# Patient Record
Sex: FEMALE | Race: BLACK OR AFRICAN AMERICAN | URBAN - METROPOLITAN AREA
[De-identification: names, ages, dates, MRNs, and addresses within clinical notes are randomized per-mention and may not be internally consistent; named-entity substitution may affect disease eponyms.]

---

## 2017-04-07 ENCOUNTER — TELEPHONE (OUTPATIENT)
Dept: INTERNAL MEDICINE CLINIC | Age: 42
End: 2017-04-07

## 2017-04-07 NOTE — TELEPHONE ENCOUNTER
----- Message from Kaden Mcconnell sent at 4/7/2017  1:05 PM EDT -----  Regarding: Dr. Britni Villasenor Ms. Dolly Shelton P) [phone], from disability, was inquiring about the status of the records request mailed on 03/21/17.

## 2018-05-02 ENCOUNTER — OFFICE VISIT (OUTPATIENT)
Dept: INTERNAL MEDICINE CLINIC | Age: 43
End: 2018-05-02

## 2018-05-02 VITALS
BODY MASS INDEX: 45.99 KG/M2 | HEART RATE: 71 BPM | OXYGEN SATURATION: 98 % | DIASTOLIC BLOOD PRESSURE: 69 MMHG | RESPIRATION RATE: 17 BRPM | SYSTOLIC BLOOD PRESSURE: 149 MMHG | HEIGHT: 67 IN | WEIGHT: 293 LBS | TEMPERATURE: 98 F

## 2018-05-02 DIAGNOSIS — Z12.31 SCREENING MAMMOGRAM, ENCOUNTER FOR: ICD-10-CM

## 2018-05-02 DIAGNOSIS — I10 ESSENTIAL HYPERTENSION: ICD-10-CM

## 2018-05-02 DIAGNOSIS — E66.01 OBESITY, MORBID (HCC): ICD-10-CM

## 2018-05-02 DIAGNOSIS — E11.9 TYPE 2 DIABETES MELLITUS WITHOUT COMPLICATION, WITHOUT LONG-TERM CURRENT USE OF INSULIN (HCC): Primary | ICD-10-CM

## 2018-05-02 LAB — HBA1C MFR BLD HPLC: 8 %

## 2018-05-02 RX ORDER — METFORMIN HYDROCHLORIDE 1000 MG/1
1000 TABLET ORAL 2 TIMES DAILY WITH MEALS
Qty: 60 TAB | Refills: 1 | Status: SHIPPED | OUTPATIENT
Start: 2018-05-02 | End: 2018-05-02 | Stop reason: SDUPTHER

## 2018-05-02 RX ORDER — LISINOPRIL 20 MG/1
20 TABLET ORAL DAILY
Qty: 30 TAB | Refills: 2 | Status: SHIPPED | OUTPATIENT
Start: 2018-05-02 | End: 2018-05-02 | Stop reason: SDUPTHER

## 2018-05-02 RX ORDER — METFORMIN HYDROCHLORIDE 1000 MG/1
1000 TABLET ORAL 2 TIMES DAILY WITH MEALS
Qty: 60 TAB | Refills: 2 | Status: SHIPPED | OUTPATIENT
Start: 2018-05-02 | End: 2019-06-20 | Stop reason: SDUPTHER

## 2018-05-02 RX ORDER — LISINOPRIL 20 MG/1
20 TABLET ORAL DAILY
Qty: 30 TAB | Refills: 2 | Status: SHIPPED | OUTPATIENT
Start: 2018-05-02 | End: 2019-06-20 | Stop reason: SDUPTHER

## 2018-05-02 NOTE — LETTER
5/4/2018 11:55 AM 
 
Ms. Nina Marrufo 
[address] [address] [address] [facility] Dear Nina Marrufo: 
 
Please find your most recent results below. Resulted Orders AMB POC HEMOGLOBIN A1C Result Value Ref Range Hemoglobin A1c (POC) 8.0 % MICROALBUMIN, UR, RAND W/ MICROALB/CREAT RATIO Result Value Ref Range Creatinine, urine 158.6 Not Estab. mg/dL Microalbumin, urine 12.9 Not Estab. ug/mL Microalb/Creat ratio (ug/mg creat.) 8.1 0.0 - 30.0 mg/g creat Narrative Performed at:  [address]  [id number] : Norma Chavez MD, Phone:  [phone] LIPID PANEL Result Value Ref Range Cholesterol, total 154 100 - 199 mg/dL Triglyceride 68 0 - 149 mg/dL HDL Cholesterol 40 >39 mg/dL VLDL, calculated 14 5 - 40 mg/dL LDL, calculated 100 (H) 0 - 99 mg/dL Narrative Performed at:  [address]  [id number] : Norma Chavez MD, Phone:  [phone] METABOLIC PANEL, COMPREHENSIVE Result Value Ref Range Glucose 186 (H) 65 - 99 mg/dL BUN 9 6 - 24 mg/dL Creatinine 0.68 0.57 - 1.00 mg/dL GFR est non- >59 mL/min/1.73 GFR est  >59 mL/min/1.73  
 BUN/Creatinine ratio 13 9 - 23 Sodium 139 134 - 144 mmol/L Potassium 4.3 3.5 - 5.2 mmol/L Chloride 97 96 - 106 mmol/L  
 CO2 21 18 - 29 mmol/L Calcium 9.3 8.7 - 10.2 mg/dL Protein, total 7.2 6.0 - 8.5 g/dL Albumin 4.1 3.5 - 5.5 g/dL GLOBULIN, TOTAL 3.1 1.5 - 4.5 g/dL A-G Ratio 1.3 1.2 - 2.2 Bilirubin, total 0.5 0.0 - 1.2 mg/dL Alk. phosphatase 84 39 - 117 IU/L  
 AST (SGOT) 11 0 - 40 IU/L  
 ALT (SGPT) 10 0 - 32 IU/L Narrative Performed at:  [address]  [id number] : Norma Chavez MD, Phone:  [phone] TSH AND FREE T4 Result Value Ref Range TSH 2.870 0.450 - 4.500 uIU/mL T4, Free 1.39 0.82 - 1.77 ng/dL Narrative Performed at:  [address]  [id number] : Abhishek Ba MD, Phone:  [phone] CVD REPORT Result Value Ref Range INTERPRETATION Note Comment:  
   Supplemental report is available. Narrative Performed at:  [address] [place] [address]  [id number] : Francisco Rodriguez MD, Phone:  [phone] DIABETES PATIENT EDUCATION Result Value Ref Range PDF Image Not applicable Narrative Performed at:  [address] [place] [address]  [id number] : Francisco Rodriguez MD, Phone:  [phone] RECOMMENDATIONS: 
 
1. Thyroid, kidney, liver function is normal.  
 
2. Cholesterol level is stable Please call me if you have any questions: [phone] Sincerely, 
 
 
Rey Roy MD

## 2018-05-02 NOTE — MR AVS SNAPSHOT
[address] [place] [address]. Mick[address] Far[address] [address] 
[phone] Patient: Rocio Rivas MRN: [id number] :[id number] Visit Information Date & Time Provider Department Dept. Phone Encounter #  
 2018 10:30 AM Rodríguez Morrison MD [facility] Internal Medicine [phone] [id number] Follow-up Instructions Return in about 3 months (around 2018), or if symptoms worsen or fail to improve. Upcoming Health Maintenance Date Due  
 LIPID PANEL Q1 1974 FOOT EXAM Q1 1984 EYE EXAM RETINAL OR DILATED Q1 1984 Pneumococcal 19-64 Medium Risk (1 of 1 - PPSV23) 1993 DTaP/Tdap/Td series (1 - Tdap) 1995 PAP AKA CERVICAL CYTOLOGY 1995 HEMOGLOBIN A1C Q6M 2016 MICROALBUMIN Q1 2017 Influenza Age 5 to Adult 2018 Allergies as of 2018  Review Complete On: 2018 By: Kymberly Frost MD  
 No Known Allergies Current Immunizations  Never Reviewed No immunizations on file. Not reviewed this visit You Were Diagnosed With   
  
 Codes Comments Type 2 diabetes mellitus without complication, without long-term current use of insulin (HCC)    -  Primary ICD-10-CM: E11.9 ICD-9-CM: 250.00 Essential hypertension     ICD-10-CM: I10 
ICD-9-CM: 401.9 Obesity, morbid ([facility] [address]     ICD-10-CM: E66.01 
ICD-9-CM: 278.01 Screening mammogram, encounter for     ICD-10-CM: Z12.31 
ICD-9-CM: V76.12 Vitals BP Pulse Temp Resp Height(growth percentile) Weight(growth percentile) 149/69 (BP 1 Location: Left arm, BP Patient Position: Sitting) 71 98 °F (36.7 °C) (Temporal) 17 5' 7\" (1.702 m) 296 lb 9.6 oz (134.5 kg) LMP SpO2 BMI OB Status Smoking Status 2018 98% 46.45 kg/m2 Having regular periods Never Smoker Vitals History BMI and BSA Data Body Mass Index Body Surface Area  
 46.45 kg/m 2 2.52 m 2 Preferred Pharmacy Pharmacy Name Phone [id number] [address] [facility] [phone] Your Updated Medication List  
  
   
This list is accurate as of 5/2/18 10:51 AM.  Always use your most recent med list.  
  
  
  
  
 Blood-Glucose Meter monitoring kit Check blood sugar daily Lancets Misc Check blood sugar daily  
  
 lidocaine 5 % ointment Commonly known as:  XYLOCAINE Apply  to affected area as needed for Pain. lisinopril 20 mg tablet Commonly known as:  Reji Human Take 1 Tab by mouth daily. metFORMIN 1,000 mg tablet Commonly known as:  GLUCOPHAGE Take 1 Tab by mouth two (2) times daily (with meals). Prescriptions Sent to Pharmacy Refills  
 metFORMIN (GLUCOPHAGE) 1,000 mg tablet 1 Sig: Take 1 Tab by mouth two (2) times daily (with meals). Class: Normal  
 Pharmacy: [id number] [address] [place] [address] Ph #: [phone] Route: Oral  
 lisinopril (PRINIVIL, ZESTRIL) 20 mg tablet 2 Sig: Take 1 Tab by mouth daily. Class: Normal  
 Pharmacy: [id number] at [address] [place] [address] Ph #: [phone] Route: Oral  
  
We Performed the Following AMB POC HEMOGLOBIN A1C [81974 CPT(R)] LIPID PANEL [67941 CPT(R)] METABOLIC PANEL, COMPREHENSIVE [78515 CPT(R)] MICROALBUMIN, UR, RAND W/ MICROALB/CREAT RATIO [id number] CPT(R)] TSH AND FREE T4 [40129 CPT(R)] Follow-up Instructions Return in about 3 months (around 8/2/2018), or if symptoms worsen or fail to improve. To-Do List   
 05/02/2018 Imaging:  [facility] MAMMO BI SCREENING INCL CAD Introducing [facility]! Andrade Lee introduces [employer] patient portal. Now you can access parts of your medical record, email your doctor's office, and request medication refills online. 1. In your internet browser, go to [email or web address] 2. Click on the First Time User? Click Here link in the Sign In box. You will see the New Member Sign Up page. 3. Enter your BMdr Access Code exactly as it appears below. You will not need to use this code after youve completed the sign-up process. If you do not sign up before the expiration date, you must request a new code. · BMdr Access Code: [email or web address] Expires: 7/31/2018 10:51 AM 
 
4. Enter the last four digits of your Social Security Number (xxxx) and Date of Birth (mm/dd/yyyy) as indicated and click Submit. You will be taken to the next sign-up page. 5. Create a BMdr ID. This will be your BMdr login ID and cannot be changed, so think of one that is secure and easy to remember. 6. Create a BMdr password. You can change your password at any time. 7. Enter your Password Reset Question and Answer. This can be used at a later time if you forget your password. 8. Enter your e-mail address. You will receive e-mail notification when new information is available in [address] 9. Click Sign Up. You can now view and download portions of your medical record. 10. Click the Download Summary menu link to download a portable copy of your medical information. If you have questions, please visit the Frequently Asked Questions section of the BMdr website. Remember, BMdr is NOT to be used for urgent needs. For medical emergencies, dial 911. Now available from your iPhone and Android! Please provide this summary of care documentation to your next provider. Your primary care clinician is listed as Rodríguezjacky Laws. If you have any questions after today's visit, please call [phone].

## 2018-05-02 NOTE — PROGRESS NOTES
Subjective:     Chief Complaint   Patient presents with    Diabetes    Hypertension    Medication Refill     has not had any medication in over a year d/t insurance        She  is a 39y.o. year old pleasent female with h/o DM-2, HTN who presents today after two years for follow up on her chronic medical condition. She had not had any med for the past one year due to not having health insurance. Last a1c was 11. in 2016. She denies any cough, chest pain, urinary symptoms, blurred vision. Pertinent items are noted in HPI.   Objective:     Vitals:    05/02/18 1018   BP: 149/69   Pulse: 71   Resp: 17   Temp: 98 °F (36.7 °C)   TempSrc: Temporal   SpO2: 98%   Weight: 296 lb 9.6 oz (134.5 kg)   Height: 5' 7\" (1.702 m)       Physical Examination: General appearance - alert, well appearing, and in no distress, oriented to person, place, and time and overweight  Mental status - alert, oriented to person, place, and time, normal mood, behavior, speech, dress, motor activity, and thought processes  Chest - clear to auscultation, no wheezes, rales or rhonchi, symmetric air entry  Heart - normal rate, regular rhythm, normal S1, S2, no murmurs, rubs, clicks or gallops  Musculoskeletal - no joint tenderness, deformity or swelling  Extremities - peripheral pulses normal, no pedal edema, no clubbing or cyanosis, feet normal, good pulses, normal color, temperature and sensation, monofilament sensory exam is normal in both feet    No Known Allergies   Social History     Social History    Marital status:      Spouse name: N/A    Number of children: N/A    Years of education: N/A     Social History Main Topics    Smoking status: Never Smoker    Smokeless tobacco: Never Used    Alcohol use No      Comment: rarely    Drug use: No    Sexual activity: Yes     Partners: Male     Other Topics Concern    None     Social History Narrative      Family History   Problem Relation Age of Onset    Heart Disease Mother    Wilber Mora Hypertension Mother     Stroke Mother     Heart Attack Mother     Diabetes Mother     Diabetes Father     Cancer Maternal Aunt      breast cancer      Past Surgical History:   Procedure Laterality Date    HX BREAST REDUCTION      HX CHOLECYSTECTOMY      HX TUBAL LIGATION        Past Medical History:   Diagnosis Date    Anxiety     Depression     Diabetes ([facility] [address]     Hypertension     Sciatica of left side     Sleep apnea       Current Outpatient Prescriptions   Medication Sig Dispense Refill    metFORMIN (GLUCOPHAGE) 1,000 mg tablet Take 1 Tab by mouth two (2) times daily (with meals). 60 Tab 2    lisinopril (PRINIVIL, ZESTRIL) 20 mg tablet Take 1 Tab by mouth daily. 30 Tab 2    Lancets misc Check blood sugar daily 1 Each 11    Blood-Glucose Meter monitoring kit Check blood sugar daily 1 Kit 0    lidocaine (XYLOCAINE) 5 % ointment Apply  to affected area as needed for Pain. Assessment/ Plan:   Diagnoses and all orders for this visit:    1. Type 2 diabetes mellitus without complication, without long-term current use of insulin (HCC)  -     AMB POC HEMOGLOBIN A1C  Last Point of Care HGB A1C  Hemoglobin A1c (POC)   Date Value Ref Range Status   05/02/2018 8.0 % Final        -     MICROALBUMIN, UR, RAND W/ MICROALB/CREAT RATIO  -     LIPID PANEL  -     METABOLIC PANEL, COMPREHENSIVE  -    Restart  metFORMIN (GLUCOPHAGE) 1,000 mg tablet; Take 1 Tab by mouth two (2) times daily (with meals). -      DIABETES FOOT EXAM    2. Essential hypertension  -     METABOLIC PANEL, COMPREHENSIVE  -  Low salt diet, exercise. -    Restart  lisinopril (PRINIVIL, ZESTRIL) 20 mg tablet; Take 1 Tab by mouth daily. 3. Obesity, morbid ([facility] [address]  -    Encouraged to work on regular exercvise, diet.  -    TSH AND FREE T4    4. Screening mammogram, encounter for  -     [facility] MAMMO BI SCREENING INCL CAD; Future           Medication risks/benefits/costs/interactions/alternatives discussed with patient.   Advised patient to call back or return to office if symptoms worsen/change/persist. If patient cannot reach us or should anything more severe/urgent arise he/she should proceed directly to the nearest emergency department. Discussed expected course/resolution/complications of diagnosis in detail with patient. Patient given a written after visit summary which includes her diagnoses, current medications and vitals. Patient expressed understanding with the diagnosis and plan. Follow-up Disposition:  Return in about 3 months (around 8/2/2018), or if symptoms worsen or fail to improve.

## 2018-05-03 LAB
ALBUMIN SERPL-MCNC: 4.1 G/DL (ref 3.5–5.5)
ALBUMIN/CREAT UR: 8.1 MG/G CREAT (ref 0–30)
ALBUMIN/GLOB SERPL: 1.3 {RATIO} (ref 1.2–2.2)
ALP SERPL-CCNC: 84 IU/L (ref 39–117)
ALT SERPL-CCNC: 10 IU/L (ref 0–32)
AST SERPL-CCNC: 11 IU/L (ref 0–40)
BILIRUB SERPL-MCNC: 0.5 MG/DL (ref 0–1.2)
BUN SERPL-MCNC: 9 MG/DL (ref 6–24)
BUN/CREAT SERPL: 13 (ref 9–23)
CALCIUM SERPL-MCNC: 9.3 MG/DL (ref 8.7–10.2)
CHLORIDE SERPL-SCNC: 97 MMOL/L (ref 96–106)
CHOLEST SERPL-MCNC: 154 MG/DL (ref 100–199)
CO2 SERPL-SCNC: 21 MMOL/L (ref 18–29)
CREAT SERPL-MCNC: 0.68 MG/DL (ref 0.57–1)
CREAT UR-MCNC: 158.6 MG/DL
GFR SERPLBLD CREATININE-BSD FMLA CKD-EPI: 107 ML/MIN/1.73
GFR SERPLBLD CREATININE-BSD FMLA CKD-EPI: 123 ML/MIN/1.73
GLOBULIN SER CALC-MCNC: 3.1 G/DL (ref 1.5–4.5)
GLUCOSE SERPL-MCNC: 186 MG/DL (ref 65–99)
HDLC SERPL-MCNC: 40 MG/DL
INTERPRETATION, 910389: NORMAL
LDLC SERPL CALC-MCNC: 100 MG/DL (ref 0–99)
Lab: NORMAL
MICROALBUMIN UR-MCNC: 12.9 UG/ML
POTASSIUM SERPL-SCNC: 4.3 MMOL/L (ref 3.5–5.2)
PROT SERPL-MCNC: 7.2 G/DL (ref 6–8.5)
SODIUM SERPL-SCNC: 139 MMOL/L (ref 134–144)
T4 FREE SERPL-MCNC: 1.39 NG/DL (ref 0.82–1.77)
TRIGL SERPL-MCNC: 68 MG/DL (ref 0–149)
TSH SERPL DL<=0.005 MIU/L-ACNC: 2.87 UIU/ML (ref 0.45–4.5)
VLDLC SERPL CALC-MCNC: 14 MG/DL (ref 5–40)

## 2018-05-04 NOTE — PROGRESS NOTES
Please mail letter:    1. Thyroid, kidney, liver function is normal.    2. Cholesterol level is stable.

## 2019-06-20 ENCOUNTER — OFFICE VISIT (OUTPATIENT)
Dept: FAMILY MEDICINE CLINIC | Age: 44
End: 2019-06-20

## 2019-06-20 VITALS
BODY MASS INDEX: 45.99 KG/M2 | TEMPERATURE: 98.2 F | HEIGHT: 67 IN | SYSTOLIC BLOOD PRESSURE: 136 MMHG | HEART RATE: 72 BPM | RESPIRATION RATE: 18 BRPM | DIASTOLIC BLOOD PRESSURE: 83 MMHG | WEIGHT: 293 LBS | OXYGEN SATURATION: 97 %

## 2019-06-20 DIAGNOSIS — R10.12 LUQ ABDOMINAL PAIN: ICD-10-CM

## 2019-06-20 DIAGNOSIS — E11.9 TYPE 2 DIABETES MELLITUS WITHOUT COMPLICATION, WITHOUT LONG-TERM CURRENT USE OF INSULIN (HCC): Primary | ICD-10-CM

## 2019-06-20 DIAGNOSIS — I10 ESSENTIAL HYPERTENSION: ICD-10-CM

## 2019-06-20 LAB — HBA1C MFR BLD HPLC: 11 %

## 2019-06-20 RX ORDER — METFORMIN HYDROCHLORIDE 1000 MG/1
1000 TABLET ORAL 2 TIMES DAILY WITH MEALS
Qty: 60 TAB | Refills: 2 | Status: SHIPPED | OUTPATIENT
Start: 2019-06-20 | End: 2019-06-20

## 2019-06-20 RX ORDER — LISINOPRIL 20 MG/1
20 TABLET ORAL DAILY
Qty: 30 TAB | Refills: 5 | Status: ON HOLD | OUTPATIENT
Start: 2019-06-20 | End: 2021-12-18

## 2019-06-20 RX ORDER — LISINOPRIL 20 MG/1
20 TABLET ORAL DAILY
Qty: 30 TAB | Refills: 2 | Status: SHIPPED | OUTPATIENT
Start: 2019-06-20 | End: 2019-06-20 | Stop reason: SDUPTHER

## 2019-06-20 RX ORDER — PIOGLITAZONEHYDROCHLORIDE 30 MG/1
30 TABLET ORAL DAILY
Qty: 30 TAB | Refills: 3 | Status: ON HOLD | OUTPATIENT
Start: 2019-06-20 | End: 2021-12-18

## 2019-06-20 RX ORDER — METFORMIN HYDROCHLORIDE 500 MG/1
2000 TABLET, EXTENDED RELEASE ORAL
Qty: 120 TAB | Refills: 3 | Status: ON HOLD | OUTPATIENT
Start: 2019-06-20 | End: 2021-12-18

## 2019-06-20 NOTE — PROGRESS NOTES
Katarzyna Moreno is a 38 y.o. female   Chief Complaint   Patient presents with    New Patient    Diabetes     pt c/o \"burning/ tingling in B/L feet\"    Mass     pt c/o \"lump\" on the left side of abdomen    Medication Evaluation     Metformin, pt would like to discuss alternative    Pt here with uncontrolled type 2 diabetes and is off her metformin. Pt does not tolerate the metformin, was giving her diarrhea and stomach upset. Sugar yesterday was 350. Pt does have an eye dr and is UTD with eye exam denies diabetic eye dz. Pt is getting some neuropathy. Pt also noticed a lump on L upper abd and states it is uncomfortable and tender to touch. No fever no chills. Pt has not done anythijng for this. she is a 38y.o. year old female who presents for evalution. Reviewed PmHx, RxHx, FmHx, SocHx, AllgHx and updated and dated in the chart. Review of Systems - negative except as listed above in the HPI    Objective:     Vitals:    06/20/19 0926   BP: 136/83   Pulse: 72   Resp: 18   Temp: 98.2 °F (36.8 °C)   TempSrc: Oral   SpO2: 97%   Weight: 296 lb 6.4 oz (134.4 kg)   Height: 5' 7\" (1.702 m)       Current Outpatient Medications   Medication Sig    lisinopril (PRINIVIL, ZESTRIL) 20 mg tablet Take 1 Tab by mouth daily.  pioglitazone (ACTOS) 30 mg tablet Take 1 Tab by mouth daily.  metFORMIN ER (GLUCOPHAGE XR) 500 mg tablet Take 4 Tabs by mouth daily (with dinner).  exenatide microspheres (BYDUREON) 2 mg/0.65 mL pnij 0.65 mL by SubCUTAneous route every seven (7) days.  Lancets misc Check blood sugar daily    Blood-Glucose Meter monitoring kit Check blood sugar daily    lidocaine (XYLOCAINE) 5 % ointment Apply  to affected area as needed for Pain. No current facility-administered medications for this visit.         Physical Examination: General appearance - alert, well appearing, and in no distress  Mental status - alert, oriented to person, place, and time  Chest - clear to auscultation, no wheezes, rales or rhonchi, symmetric air entry  Heart - normal rate, regular rhythm, normal S1, S2, no murmurs, rubs, clicks or gallops  Abdomen - pain is at tip of 12th rib ttp      Assessment/ Plan:   Diagnoses and all orders for this visit:    1. Type 2 diabetes mellitus without complication, without long-term current use of insulin (HCC)  -     LIPID PANEL  -     METABOLIC PANEL, COMPREHENSIVE  -     MICROALBUMIN, UR, RAND W/ MICROALB/CREAT RATIO  -     AMB POC HEMOGLOBIN A1C  -     pioglitazone (ACTOS) 30 mg tablet; Take 1 Tab by mouth daily. -     metFORMIN ER (GLUCOPHAGE XR) 500 mg tablet; Take 4 Tabs by mouth daily (with dinner). -     exenatide microspheres (BYDUREON) 2 mg/0.65 mL pnij; 0.65 mL by SubCUTAneous route every seven (7) days. 2. Essential hypertension  -     METABOLIC PANEL, COMPREHENSIVE  -     lisinopril (PRINIVIL, ZESTRIL) 20 mg tablet; Take 1 Tab by mouth daily. 3. LUQ abdominal pain  Rib pain, warm compress lay on other side, leave ricky     Follow-up and Dispositions    · Return if symptoms worsen or fail to improve. I have discussed the diagnosis with the patient and the intended plan as seen in the above orders. The patient has received an after-visit summary and questions were answered concerning future plans. Pt conveyed understanding of plan.     Medication Side Effects and Warnings were discussed with patient      Freda Howard DO

## 2019-06-20 NOTE — PATIENT INSTRUCTIONS

## 2019-06-20 NOTE — PROGRESS NOTES
Jun Ruby is a 38 y.o. female    Chief Complaint   Patient presents with    New Patient    Diabetes     pt c/o \"burning/ tingling in B/L feet\"    Mass     pt c/o \"lump\" on the left side of abdomen    Medication Evaluation     Metformin, pt would like to discuss alternative     1. Have you been to the ER, urgent care clinic since your last visit? Hospitalized since your last visit? Patient admitted to [facility] for emrergent gall stone removal  May 2019.    2. Have you seen or consulted any other health care providers outside of the [address] [place] [address] since your last visit? Include any pap smears or colon screening.  No    Visit Vitals  /83 (BP 1 Location: Left arm, BP Patient Position: Sitting)   Pulse 72   Temp 98.2 °F (36.8 °C) (Oral)   Resp 18   Ht 5' 7\" (1.702 m)   Wt 296 lb 6.4 oz (134.4 kg)   SpO2 97%   BMI 46.42 kg/m²

## 2019-06-21 LAB
ALBUMIN SERPL-MCNC: 4 G/DL (ref 3.5–5.5)
ALBUMIN/CREAT UR: 10.7 MG/G CREAT (ref 0–30)
ALBUMIN/GLOB SERPL: 1.2 {RATIO} (ref 1.2–2.2)
ALP SERPL-CCNC: 125 IU/L (ref 39–117)
ALT SERPL-CCNC: 12 IU/L (ref 0–32)
AST SERPL-CCNC: 13 IU/L (ref 0–40)
BILIRUB SERPL-MCNC: 0.6 MG/DL (ref 0–1.2)
BUN SERPL-MCNC: 7 MG/DL (ref 6–24)
BUN/CREAT SERPL: 10 (ref 9–23)
CALCIUM SERPL-MCNC: 9.1 MG/DL (ref 8.7–10.2)
CHLORIDE SERPL-SCNC: 101 MMOL/L (ref 96–106)
CHOLEST SERPL-MCNC: 168 MG/DL (ref 100–199)
CO2 SERPL-SCNC: 23 MMOL/L (ref 20–29)
CREAT SERPL-MCNC: 0.7 MG/DL (ref 0.57–1)
CREAT UR-MCNC: 115.8 MG/DL
GLOBULIN SER CALC-MCNC: 3.3 G/DL (ref 1.5–4.5)
GLUCOSE SERPL-MCNC: 264 MG/DL (ref 65–99)
HDLC SERPL-MCNC: 41 MG/DL
INTERPRETATION, 910389: NORMAL
LDLC SERPL CALC-MCNC: 113 MG/DL (ref 0–99)
Lab: NORMAL
MICROALBUMIN UR-MCNC: 12.4 UG/ML
POTASSIUM SERPL-SCNC: 3.9 MMOL/L (ref 3.5–5.2)
PROT SERPL-MCNC: 7.3 G/DL (ref 6–8.5)
SODIUM SERPL-SCNC: 139 MMOL/L (ref 134–144)
TRIGL SERPL-MCNC: 69 MG/DL (ref 0–149)
VLDLC SERPL CALC-MCNC: 14 MG/DL (ref 5–40)

## 2019-06-25 NOTE — PROGRESS NOTES
Glucose is high but we know she has uncontrolled diabetes and she should be using metformin actos and bydureon at this point. Chol is above goal and rec is for all diabetics to be on a statin like lipitor 10mg to help bring her ldl to goal <100.   If agreeable will send in, should f/u in a month with glucose logs so we can adjust meds if needed

## 2019-06-26 ENCOUNTER — TELEPHONE (OUTPATIENT)
Dept: FAMILY MEDICINE CLINIC | Age: 44
End: 2019-06-26

## 2019-06-26 RX ORDER — ATORVASTATIN CALCIUM 10 MG/1
10 TABLET, FILM COATED ORAL DAILY
Qty: 30 TAB | Refills: 5 | Status: ON HOLD | OUTPATIENT
Start: 2019-06-26 | End: 2021-12-18

## 2019-06-26 NOTE — PROGRESS NOTES
Patient returned call. Spoke with patient in reference to labs. Verbalized understanding. Patient is in agreement with starting Lipitor. Please send in.

## 2019-12-13 ENCOUNTER — OFFICE VISIT (OUTPATIENT)
Dept: FAMILY MEDICINE CLINIC | Age: 44
End: 2019-12-13

## 2019-12-13 VITALS
TEMPERATURE: 98.9 F | WEIGHT: 293 LBS | HEIGHT: 67 IN | RESPIRATION RATE: 16 BRPM | SYSTOLIC BLOOD PRESSURE: 132 MMHG | OXYGEN SATURATION: 98 % | DIASTOLIC BLOOD PRESSURE: 75 MMHG | BODY MASS INDEX: 45.99 KG/M2 | HEART RATE: 72 BPM

## 2019-12-13 DIAGNOSIS — E11.65 TYPE 2 DIABETES MELLITUS WITH HYPERGLYCEMIA, WITH LONG-TERM CURRENT USE OF INSULIN (HCC): ICD-10-CM

## 2019-12-13 DIAGNOSIS — Z01.818 PRE-OP EVALUATION: Primary | ICD-10-CM

## 2019-12-13 DIAGNOSIS — Z79.4 TYPE 2 DIABETES MELLITUS WITH HYPERGLYCEMIA, WITH LONG-TERM CURRENT USE OF INSULIN (HCC): ICD-10-CM

## 2019-12-13 LAB — HBA1C MFR BLD HPLC: 9.8 %

## 2019-12-13 RX ORDER — PEN NEEDLE, DIABETIC 31 GX3/16"
NEEDLE, DISPOSABLE MISCELLANEOUS
Qty: 30 PEN NEEDLE | Refills: 11 | Status: ON HOLD | OUTPATIENT
Start: 2019-12-13 | End: 2021-12-18

## 2019-12-13 NOTE — PROGRESS NOTES
Alton Jesus is a 38 y.o. female   Chief Complaint   Patient presents with    Pre-op Exam    pt here for preop and is having cataract surgery both eyes but separate. R on 11/18/19. Pt is an uncontrolled diabetic last A1C was 11. Alton Jesus is a 38 y.o. female is a 38 y.o. yo female who presents for preoperative evaluation. Latex Allergy:NO    History of anesthesia reaction: NO    History of PE/DVT:NO    No Known Allergies    Current Outpatient Medications   Medication Sig    insulin detemir U-100 (LEVEMIR FLEXTOUCH) 100 unit/mL (3 mL) inpn 24 Units by SubCUTAneous route nightly.  Insulin Needles, Disposable, (DAVID PEN NEEDLE) 32 gauge x 5/32\" ndle 1 shot daily    atorvastatin (LIPITOR) 10 mg tablet Take 1 Tab by mouth daily.  lisinopril (PRINIVIL, ZESTRIL) 20 mg tablet Take 1 Tab by mouth daily.  pioglitazone (ACTOS) 30 mg tablet Take 1 Tab by mouth daily.  metFORMIN ER (GLUCOPHAGE XR) 500 mg tablet Take 4 Tabs by mouth daily (with dinner).  exenatide microspheres (BYDUREON) 2 mg/0.65 mL pnij 0.65 mL by SubCUTAneous route every seven (7) days.  Lancets misc Check blood sugar daily    Blood-Glucose Meter monitoring kit Check blood sugar daily    lidocaine (XYLOCAINE) 5 % ointment Apply  to affected area as needed for Pain. No current facility-administered medications for this visit. Patient Active Problem List   Diagnosis Code    Type 2 diabetes mellitus without complication ([facility] [address] T44.3    Essential hypertension I10    Obesity, morbid ([facility] [address] E66.01       Past Surgical History:   Procedure Laterality Date    HX BREAST REDUCTION      HX CHOLECYSTECTOMY      HX TUBAL LIGATION         Reviewed PmHx, RxHx, FmHx, SocHx, AllgHx and updated and dated in the chart.     Review of Systems - negative except as listed above in the HPI    Objective:     Vitals:    12/13/19 0841 12/13/19 0905   BP: (!) 129/92 132/75   Pulse: 72    Resp: 16    Temp: 98.9 °F (37.2 °C) TempSrc: Oral    SpO2: 98%    Weight: 306 lb (138.8 kg)    Height: 5' 7\" (1.702 m)      Physical Examination: General appearance - alert, well appearing, and in no distress  Mental status - alert, oriented to person, place, and time  Lymphatics - no palpable lymphadenopathy, no hepatosplenomegaly  Chest - clear to auscultation, no wheezes, rales or rhonchi, symmetric air entry  Heart - normal rate, regular rhythm, normal S1, S2, no murmurs, rubs, clicks or gallops  Abdomen - soft, nontender, nondistended, no masses or organomegaly  Neurological - alert, oriented, normal speech, no focal findings or movement disorder noted  Extremities - peripheral pulses normal, no pedal edema, no clubbing or cyanosis    Assessment/ Plan:   Diagnoses and all orders for this visit:    1. Pre-op evaluation  -     AMB POC HEMOGLOBIN A1C  Pt is not stable for surgery given hyperglycemia will start on insulin with pt to take daily glucose f/u in 1 month with logs for adjustment  2. Type 2 diabetes mellitus with hyperglycemia, with long-term current use of insulin ([facility])  -     AMB POC HEMOGLOBIN A1C  -     insulin detemir U-100 (LEVEMIR FLEXTOUCH) 100 unit/mL (3 mL) inpn; 24 Units by SubCUTAneous route nightly. -     Insulin Needles, Disposable, (DAVID PEN NEEDLE) 32 gauge x 5/32\" ndle; 1 shot daily           I have discussed the diagnosis with the patient and the intended plan as seen in the above orders. The patient has received an after-visit summary and questions were answered concerning future plans. Pt conveyed understanding of plan.     Medication Side Effects and Warnings were discussed with patient      Torey Ask, DO

## 2019-12-13 NOTE — PROGRESS NOTES
Chief Complaint   Patient presents with    Pre-op Exam     Patient presents in office today for pre-op clearance to cataract surgery. Procedure is scheduled for 12/18/19. No concerns. 1. Have you been to the ER, urgent care clinic since your last visit? Hospitalized since your last visit? No    2. Have you seen or consulted any other health care providers outside of the [address] [place] [address] since your last visit? Include any pap smears or colon screening.  No    Learning Assessment 6/20/2019   PRIMARY LEARNER Patient   BARRIERS PRIMARY LEARNER NONE   CO-LEARNER CAREGIVER -   PRIMARY LANGUAGE ENGLISH   LEARNER PREFERENCE PRIMARY LISTENING     PICTURES     VIDEOS   ANSWERED BY patient   RELATIONSHIP SELF

## 2019-12-13 NOTE — PATIENT INSTRUCTIONS

## 2021-12-17 ENCOUNTER — APPOINTMENT (OUTPATIENT)
Dept: CT IMAGING | Age: 46
DRG: 720 | End: 2021-12-17
Attending: FAMILY MEDICINE
Payer: COMMERCIAL

## 2021-12-17 ENCOUNTER — HOSPITAL ENCOUNTER (INPATIENT)
Age: 46
LOS: 2 days | Discharge: HOME | DRG: 720 | End: 2021-12-20
Attending: FAMILY MEDICINE | Admitting: INTERNAL MEDICINE
Payer: COMMERCIAL

## 2021-12-17 DIAGNOSIS — A41.9 SEPSIS WITHOUT ACUTE ORGAN DYSFUNCTION, DUE TO UNSPECIFIED ORGANISM (HCC): ICD-10-CM

## 2021-12-17 DIAGNOSIS — N73.9 ABSCESS OF FEMALE PELVIS: ICD-10-CM

## 2021-12-17 DIAGNOSIS — L02.91 ABSCESS: Primary | ICD-10-CM

## 2021-12-17 PROBLEM — L03.90 CELLULITIS: Status: ACTIVE | Noted: 2021-12-17

## 2021-12-17 LAB
ANION GAP SERPL CALC-SCNC: 12 MMOL/L (ref 5–15)
BASOPHILS # BLD: 0 K/UL (ref 0–0.1)
BASOPHILS NFR BLD: 0 % (ref 0–1)
BUN SERPL-MCNC: 8 MG/DL (ref 6–20)
BUN/CREAT SERPL: 8 (ref 12–20)
CA-I BLD-MCNC: 8.8 MG/DL (ref 8.5–10.1)
CHLORIDE SERPL-SCNC: 99 MMOL/L (ref 97–108)
CO2 SERPL-SCNC: 26 MMOL/L (ref 21–32)
CREAT SERPL-MCNC: 1 MG/DL (ref 0.55–1.02)
DIFFERENTIAL METHOD BLD: ABNORMAL
EOSINOPHIL # BLD: 0 K/UL (ref 0–0.4)
EOSINOPHIL NFR BLD: 0 % (ref 0–7)
ERYTHROCYTE [DISTWIDTH] IN BLOOD BY AUTOMATED COUNT: 12.5 % (ref 11.5–14.5)
GLUCOSE BLD STRIP.AUTO-MCNC: 313 MG/DL (ref 65–117)
GLUCOSE SERPL-MCNC: 311 MG/DL (ref 65–100)
HCT VFR BLD AUTO: 32 % (ref 35–47)
HGB BLD-MCNC: 10.8 G/DL (ref 11.5–16)
IMM GRANULOCYTES # BLD AUTO: 0.1 K/UL (ref 0–0.04)
IMM GRANULOCYTES NFR BLD AUTO: 1 % (ref 0–0.5)
LACTATE SERPL-SCNC: 1.1 MMOL/L (ref 0.4–2)
LYMPHOCYTES # BLD: 1.1 K/UL (ref 0.8–3.5)
LYMPHOCYTES NFR BLD: 6 % (ref 12–49)
MCH RBC QN AUTO: 30 PG (ref 26–34)
MCHC RBC AUTO-ENTMCNC: 33.8 G/DL (ref 30–36.5)
MCV RBC AUTO: 88.9 FL (ref 80–99)
MONOCYTES # BLD: 1.3 K/UL (ref 0–1)
MONOCYTES NFR BLD: 7 % (ref 5–13)
NEUTS SEG # BLD: 15 K/UL (ref 1.8–8)
NEUTS SEG NFR BLD: 86 % (ref 32–75)
PERFORMED BY, TECHID: ABNORMAL
PLATELET # BLD AUTO: 154 K/UL (ref 150–400)
PMV BLD AUTO: 11.8 FL (ref 8.9–12.9)
POTASSIUM SERPL-SCNC: 3.9 MMOL/L (ref 3.5–5.1)
RBC # BLD AUTO: 3.6 M/UL (ref 3.8–5.2)
SODIUM SERPL-SCNC: 137 MMOL/L (ref 136–145)
WBC # BLD AUTO: 17.5 K/UL (ref 3.6–11)

## 2021-12-17 PROCEDURE — 74177 CT ABD & PELVIS W/CONTRAST: CPT

## 2021-12-17 PROCEDURE — 85025 COMPLETE CBC W/AUTO DIFF WBC: CPT

## 2021-12-17 PROCEDURE — 96365 THER/PROPH/DIAG IV INF INIT: CPT

## 2021-12-17 PROCEDURE — 82962 GLUCOSE BLOOD TEST: CPT

## 2021-12-17 PROCEDURE — 83605 ASSAY OF LACTIC ACID: CPT

## 2021-12-17 PROCEDURE — 96375 TX/PRO/DX INJ NEW DRUG ADDON: CPT

## 2021-12-17 PROCEDURE — 87040 BLOOD CULTURE FOR BACTERIA: CPT

## 2021-12-17 PROCEDURE — 99284 EMERGENCY DEPT VISIT MOD MDM: CPT

## 2021-12-17 PROCEDURE — 96376 TX/PRO/DX INJ SAME DRUG ADON: CPT

## 2021-12-17 PROCEDURE — 96367 TX/PROPH/DG ADDL SEQ IV INF: CPT

## 2021-12-17 PROCEDURE — 36415 COLL VENOUS BLD VENIPUNCTURE: CPT

## 2021-12-17 PROCEDURE — 74011000636 HC RX REV CODE- 636: Performed by: FAMILY MEDICINE

## 2021-12-17 PROCEDURE — 96366 THER/PROPH/DIAG IV INF ADDON: CPT

## 2021-12-17 PROCEDURE — 80048 BASIC METABOLIC PNL TOTAL CA: CPT

## 2021-12-17 PROCEDURE — 74011250636 HC RX REV CODE- 250/636: Performed by: FAMILY MEDICINE

## 2021-12-17 PROCEDURE — 74011000258 HC RX REV CODE- 258: Performed by: FAMILY MEDICINE

## 2021-12-17 PROCEDURE — G0378 HOSPITAL OBSERVATION PER HR: HCPCS

## 2021-12-17 RX ORDER — MORPHINE SULFATE 4 MG/ML
4 INJECTION INTRAVENOUS ONCE
Status: COMPLETED | OUTPATIENT
Start: 2021-12-17 | End: 2021-12-17

## 2021-12-17 RX ORDER — ONDANSETRON 2 MG/ML
4 INJECTION INTRAMUSCULAR; INTRAVENOUS
Status: COMPLETED | OUTPATIENT
Start: 2021-12-17 | End: 2021-12-17

## 2021-12-17 RX ADMIN — SODIUM CHLORIDE 1000 ML: 9 INJECTION, SOLUTION INTRAVENOUS at 21:39

## 2021-12-17 RX ADMIN — IOPAMIDOL 100 ML: 755 INJECTION, SOLUTION INTRAVENOUS at 16:12

## 2021-12-17 RX ADMIN — ONDANSETRON 4 MG: 2 INJECTION INTRAMUSCULAR; INTRAVENOUS at 19:48

## 2021-12-17 RX ADMIN — PIPERACILLIN AND TAZOBACTAM 3.38 G: 3; .375 INJECTION, POWDER, LYOPHILIZED, FOR SOLUTION INTRAVENOUS at 15:57

## 2021-12-17 RX ADMIN — SODIUM CHLORIDE 1000 ML: 9 INJECTION, SOLUTION INTRAVENOUS at 15:57

## 2021-12-17 RX ADMIN — VANCOMYCIN HYDROCHLORIDE 2000 MG: 1 INJECTION, POWDER, LYOPHILIZED, FOR SOLUTION INTRAVENOUS at 21:39

## 2021-12-17 RX ADMIN — MORPHINE SULFATE 4 MG: 4 INJECTION INTRAVENOUS at 17:26

## 2021-12-17 RX ADMIN — MORPHINE SULFATE 4 MG: 4 INJECTION INTRAVENOUS at 15:57

## 2021-12-17 NOTE — ED TRIAGE NOTES
Went to urgent care with a vaginal abscess Tuesday . Has abscess for 7 days. Put on two antibiotics and it was supposed to burst on its own and it did not. Taking Ibuprofen and not working. Abscess is bigger and taking over vagina per pateint. Pt not taking any of her diabetes or hypertension medications.  [place]  313

## 2021-12-17 NOTE — ED PROVIDER NOTES
EMERGENCY DEPARTMENT HISTORY AND PHYSICAL EXAM      Date: 12/17/2021  Patient Name: Tony Mayorga    History of Presenting Illness     Chief complaint: Abscess  History Provided By:     HPI: Tony Mayorga, is an extremely pleasant 51 y.o. female presenting to the ED with a chief complaint of abscess. Patient states approximately 7 days ago she noticed an abscess in her left groin. She went to urgent care and was given antibiotics but the abscess was not drained. Since this time it has become progressively larger. She states is very painful. She was concerned because she started having subjective fevers and chills. She denies any other concerns today. There are no other complaints, changes, or physical findings at this time. PCP: [place] Show, DO    No current facility-administered medications on file prior to encounter. Current Outpatient Medications on File Prior to Encounter   Medication Sig Dispense Refill    insulin detemir U-100 (LEVEMIR FLEXTOUCH) 100 unit/mL (3 mL) inpn 24 Units by SubCUTAneous route nightly. (Patient not taking: Reported on 12/17/2021) 3 Pen 3    Insulin Needles, Disposable, (DAVID PEN NEEDLE) 32 gauge x 5/32\" ndle 1 shot daily (Patient not taking: Reported on 12/17/2021) 30 Pen Needle 11    atorvastatin (LIPITOR) 10 mg tablet Take 1 Tab by mouth daily. (Patient not taking: Reported on 12/17/2021) 30 Tab 5    lisinopril (PRINIVIL, ZESTRIL) 20 mg tablet Take 1 Tab by mouth daily. (Patient not taking: Reported on 12/17/2021) 30 Tab 5    pioglitazone (ACTOS) 30 mg tablet Take 1 Tab by mouth daily. (Patient not taking: Reported on 12/17/2021) 30 Tab 3    metFORMIN ER (GLUCOPHAGE XR) 500 mg tablet Take 4 Tabs by mouth daily (with dinner). (Patient not taking: Reported on 12/17/2021) 120 Tab 3    exenatide microspheres (BYDUREON) 2 mg/0.65 mL pnij 0.65 mL by SubCUTAneous route every seven (7) days.  (Patient not taking: Reported on 12/17/2021) 4 Pen 3    Lancets misc Check blood sugar daily (Patient not taking: Reported on 12/17/2021) 1 Each 11    Blood-Glucose Meter monitoring kit Check blood sugar daily (Patient not taking: Reported on 12/17/2021) 1 Kit 0    lidocaine (XYLOCAINE) 5 % ointment Apply  to affected area as needed for Pain. (Patient not taking: Reported on 12/17/2021)         Past History     Past Medical History:  Past Medical History:   Diagnosis Date    Anxiety     Depression     Diabetes ([address]     Hypertension     Sciatica of left side     Sleep apnea        Past Surgical History:  Past Surgical History:   Procedure Laterality Date    HX BREAST REDUCTION      HX CHOLECYSTECTOMY      HX TUBAL LIGATION         Family History:  Family History   Problem Relation Age of Onset    Heart Disease Mother     Hypertension Mother    Amy See Stroke Mother     Heart Attack Mother     Diabetes Mother     Diabetes Father     Cancer Maternal Aunt         breast cancer       Social History:  Social History     Tobacco Use    Smoking status: Never Smoker    Smokeless tobacco: Never Used   Substance Use Topics    Alcohol use: No     Comment: rarely    Drug use: No       Allergies:  No Known Allergies      Review of Systems     Review of Systems   Constitutional: Negative for activity change, appetite change, chills, fatigue and fever. HENT: Negative for congestion and sore throat. Eyes: Negative for photophobia and visual disturbance. Respiratory: Negative for cough, shortness of breath and wheezing. Cardiovascular: Negative for chest pain, palpitations and leg swelling. Gastrointestinal: Negative for abdominal pain, diarrhea, nausea and vomiting. Endocrine: Negative for cold intolerance and heat intolerance. Musculoskeletal: Negative for gait problem and joint swelling. Skin: Negative for color change and rash. Abscess   Neurological: Negative for dizziness and headaches.        Physical Exam     Physical Exam  Constitutional: Appearance: She is well-developed. HENT:      Head: Normocephalic and atraumatic. Mouth/Throat:      Mouth: Mucous membranes are moist.      Pharynx: Oropharynx is clear. Eyes:      Conjunctiva/sclera: Conjunctivae normal.      Pupils: Pupils are equal, round, and reactive to light. Cardiovascular:      Rate and Rhythm: Regular rhythm. Tachycardia present. Heart sounds: No murmur heard. Pulmonary:      Effort: No respiratory distress. Breath sounds: No stridor. No wheezing, rhonchi or rales. Abdominal:      General: There is no distension. Tenderness: There is no abdominal tenderness. There is no rebound. Musculoskeletal:      Cervical back: Normal range of motion and neck supple. Skin:     General: Skin is warm and dry. Comments: Large 7 x 5 cm fluctuant lesion with surrounding erythema in the left groin. Non pulsatile   Neurological:      General: No focal deficit present. Mental Status: She is alert and oriented to person, place, and time. Psychiatric:         Mood and Affect: Mood normal.         Behavior: Behavior normal.         Lab and Diagnostic Study Results     Labs -     Recent Results (from the past 12 hour(s))   GLUCOSE, POC    Collection Time: 12/17/21 11:55 AM   Result Value Ref Range    Glucose (POC) 313 (H) 65 - 117 mg/dL    Performed by Samantha Mills    CBC WITH AUTOMATED DIFF    Collection Time: 12/17/21  3:17 PM   Result Value Ref Range    WBC 17.5 (H) 3.6 - 11.0 K/uL    RBC 3.60 (L) 3.80 - 5.20 M/uL    HGB 10.8 (L) 11.5 - 16.0 g/dL    HCT 32.0 (L) 35.0 - 47.0 %    MCV 88.9 80.0 - 99.0 FL    MCH 30.0 26.0 - 34.0 PG    MCHC 33.8 30.0 - 36.5 g/dL    RDW 12.5 11.5 - 14.5 %    PLATELET 718 219 - 898 K/uL    MPV 11.8 8.9 - 12.9 FL    NEUTROPHILS 86 (H) 32 - 75 %    LYMPHOCYTES 6 (L) 12 - 49 %    MONOCYTES 7 5 - 13 %    EOSINOPHILS 0 0 - 7 %    BASOPHILS 0 0 - 1 %    IMMATURE GRANULOCYTES 1 (H) 0.0 - 0.5 %    ABS.  NEUTROPHILS 15.0 (H) 1.8 - 8.0 K/UL ABS. LYMPHOCYTES 1.1 0.8 - 3.5 K/UL    ABS. MONOCYTES 1.3 (H) 0.0 - 1.0 K/UL    ABS. EOSINOPHILS 0.0 0.0 - 0.4 K/UL    ABS. BASOPHILS 0.0 0.0 - 0.1 K/UL    ABS. IMM. GRANS. 0.1 (H) 0.00 - 0.04 K/UL    DF AUTOMATED     METABOLIC PANEL, BASIC    Collection Time: 12/17/21  3:17 PM   Result Value Ref Range    Sodium 137 136 - 145 mmol/L    Potassium 3.9 3.5 - 5.1 mmol/L    Chloride 99 97 - 108 mmol/L    CO2 26 21 - 32 mmol/L    Anion gap 12 5 - 15 mmol/L    Glucose 311 (H) 65 - 100 mg/dL    BUN 8 6 - 20 mg/dL    Creatinine 1.00 0.55 - 1.02 mg/dL    BUN/Creatinine ratio 8 (L) 12 - 20      GFR est AA >60 >60 ml/min/1.73m2    GFR est non-AA 59 (L) >60 ml/min/1.73m2    Calcium 8.8 8.5 - 10.1 mg/dL   LACTIC ACID    Collection Time: 12/17/21  3:17 PM   Result Value Ref Range    Lactic acid 1.1 0.4 - 2.0 mmol/L       Radiologic Studies -   @[email or web address]@  CT Results  (Last 48 hours)               12/17/21 1638  CT ABD PELV W CONT Final result    Impression:  1. Subcutaneous edema and fluid centered on the left mons pubis with surrounding   inflammatory stranding. This most likely represents phlegmon/organizing abscess. No definite rim enhancement to confirm well-formed abscess at this time. 2. Indeterminate lesion in the posterior right hepatic lobe. Hepatic abscess is   in the differential diagnosis. Dedicated multiphasic contrast enhanced CT or MRI   of the liver could better characterize. 3. Please see above for additional comments and chronic findings. Narrative:  Exam: CT ABD PELV W CONT       TECHNIQUE: Multiple transaxial CT images of the abdomen and pelvis were obtained   following the uneventful administration of 100 mL Isovue 370 intravenous   contrast. Coronal and sagittal reformatted images were provided.        Dose reduction: All CT scans at this facility are performed using dose reduction   optimization techniques as appropriate to a performed exam including the   following: Automated exposure control, adjustments of the mA and/or kV according   to patient size, or use of iterative reconstruction technique. COMPARISON: None       HISTORY: Large abscess, extends from mons pubis inferiorly and to lower labia       FINDINGS:       Lower thorax: Favored linear pleural-based scarring/subsegmental atelectasis in   the lung bases. Visualized portions of the heart appear enlarged. Abdomen and pelvis:   Liver: There is a nonspecific 5.1 x 4.4 x 3.7 cm irregular hypodense lesion   within the posterior right hepatic lobe. Gallbladder: Surgically absent. Biliary system: The common duct is mildly enlarged measuring up to 7 mm,   nonspecific, but could be related to reservoir effect from prior   cholecystectomy. Pancreas: Unremarkable. Spleen: Unremarkable. Adrenal glands: Normal.   Kidneys and ureters: Water density cyst measuring up to 3.5 cm within the left   kidney. Other subcentimeter well-circumscribed hypodensities that are too small   to further characterize. The kidneys otherwise enhance symmetrically. No   hydronephrosis or hydroureter is evident. Urinary bladder: Partially distended but otherwise unremarkable. Reproductive organs: Unremarkable. Bowel: No findings of mechanical bowel obstruction. No findings of appendicitis. Peritoneum: No pneumoperitoneum. No free fluid. Lymph nodes: No abdominal/pelvic lymphadenopathy. Aorta and other vessels: The aorta is within normal limits. The systemic and   portal venous systems are opacified. Proximal splanchnic vessels appear patent. Bones: Degenerative changes within the spine, hips, and pelvis. No acute or   aggressive osseous abnormalities are evident. Superficial soft tissues: Centered on the left mons pubis there is an area of   subcutaneous edema and fluid without definite rim enhancement measuring up to   approximately 4.0 x 4.1 x 9.4 cm.  There is surrounding inflammatory stranding   within the subcutaneous fat. Mildly prominent left inguinal lymph nodes are most   likely reactive. CXR Results  (Last 48 hours)    None            Medical Decision Making   - I am the first provider for this patient. - I reviewed the vital signs, available nursing notes, past medical history, past surgical history, family history and social history. - Initial assessment performed. The patients presenting problems have been discussed, and they are in agreement with the care plan formulated and outlined with them. I have encouraged them to ask questions as they arise throughout their visit. Vital Signs-Reviewed the patient's vital signs. Patient Vitals for the past 12 hrs:   Temp Pulse Resp BP SpO2   12/17/21 1724  (!) 112 18 (!) 145/87 100 %   12/17/21 1142 98.1 °F (36.7 °C) (!) 128 18 (!) 158/86 100 %         ED Course/ Provider Notes (Medical Decision Making):     Patient presented to the emergency department with a chief complaint of abscess. On examination the patient is nontoxic but uncomfortable-appearing. Vitals were reviewed per above. She is tachycardic, leukocytosis of 17.5.,  Serum glucose elevated no 300s. She is not acidotic. Bicarb within normal range. Lactic acid normal.  Blood cultures ordered. Broad-spectrum antibiotics given. Sepsis bolus given. CT abdomen and pelvis demonstrates Centered on the left mons pubis there is an area of  subcutaneous edema and fluid without definite rim enhancement measuring up to  approximately 4.0 x 4.1 x 9.4 cm. Other findings discussed with patient. Given the size and specifically location of abscess, feel best to have general surgery drain. Case discussed with general surgeon on-call who accepted consult. Case discussed with Dr. Dedrick Luna who accepted admission. Case discussed with Dr. Juliet Rich as well who will be assuming care of patient at shift change until she is transferred.         Procedures   Medical Decision Makingedical Decision Making  Performed by: Peyton Richardson DO  Procedures  None       Disposition   Disposition:   Admission    Diagnosis     Clinical Impression:    1. Abscess    2. Sepsis without acute organ dysfunction, due to unspecified organism [facility])        Attestations:    Peyton Richardson DO    Please note that this dictation was completed with [employer], the computer voice recognition software. Quite often unanticipated grammatical, syntax, homophones, and other interpretive errors are inadvertently transcribed by the computer software. Please disregard these errors. Please excuse any errors that have escaped final proofreading. Thank you.

## 2021-12-18 ENCOUNTER — APPOINTMENT (OUTPATIENT)
Dept: GENERAL RADIOLOGY | Age: 46
DRG: 720 | End: 2021-12-18
Attending: COLON & RECTAL SURGERY
Payer: COMMERCIAL

## 2021-12-18 PROBLEM — N73.9 ABSCESS OF FEMALE PELVIS: Status: ACTIVE | Noted: 2021-12-18

## 2021-12-18 LAB
ALBUMIN SERPL-MCNC: 2.3 G/DL (ref 3.5–5)
ALBUMIN/GLOB SERPL: 0.5 {RATIO} (ref 1.1–2.2)
ALP SERPL-CCNC: 117 U/L (ref 45–117)
ALT SERPL-CCNC: 12 U/L (ref 12–78)
ANION GAP SERPL CALC-SCNC: 9 MMOL/L (ref 5–15)
AST SERPL W P-5'-P-CCNC: 6 U/L (ref 15–37)
BASOPHILS # BLD: 0 K/UL (ref 0–0.1)
BASOPHILS NFR BLD: 0 % (ref 0–1)
BILIRUB SERPL-MCNC: 0.6 MG/DL (ref 0.2–1)
BUN SERPL-MCNC: 6 MG/DL (ref 6–20)
BUN/CREAT SERPL: 7 (ref 12–20)
CA-I BLD-MCNC: 8.7 MG/DL (ref 8.5–10.1)
CHLORIDE SERPL-SCNC: 106 MMOL/L (ref 97–108)
CO2 SERPL-SCNC: 23 MMOL/L (ref 21–32)
CREAT SERPL-MCNC: 0.82 MG/DL (ref 0.55–1.02)
DIFFERENTIAL METHOD BLD: ABNORMAL
EOSINOPHIL # BLD: 0 K/UL (ref 0–0.4)
EOSINOPHIL NFR BLD: 0 % (ref 0–7)
ERYTHROCYTE [DISTWIDTH] IN BLOOD BY AUTOMATED COUNT: 12.9 % (ref 11.5–14.5)
GLOBULIN SER CALC-MCNC: 4.9 G/DL (ref 2–4)
GLUCOSE BLD STRIP.AUTO-MCNC: 259 MG/DL (ref 65–117)
GLUCOSE BLD STRIP.AUTO-MCNC: 268 MG/DL (ref 65–117)
GLUCOSE BLD STRIP.AUTO-MCNC: 270 MG/DL (ref 65–117)
GLUCOSE BLD STRIP.AUTO-MCNC: 273 MG/DL (ref 65–117)
GLUCOSE SERPL-MCNC: 299 MG/DL (ref 65–100)
HCT VFR BLD AUTO: 31.5 % (ref 35–47)
HGB BLD-MCNC: 10.1 G/DL (ref 11.5–16)
IMM GRANULOCYTES # BLD AUTO: 0.2 K/UL (ref 0–0.04)
IMM GRANULOCYTES NFR BLD AUTO: 1 % (ref 0–0.5)
LYMPHOCYTES # BLD: 1.4 K/UL (ref 0.8–3.5)
LYMPHOCYTES NFR BLD: 8 % (ref 12–49)
MCH RBC QN AUTO: 29.7 PG (ref 26–34)
MCHC RBC AUTO-ENTMCNC: 32.1 G/DL (ref 30–36.5)
MCV RBC AUTO: 92.6 FL (ref 80–99)
MONOCYTES # BLD: 1.5 K/UL (ref 0–1)
MONOCYTES NFR BLD: 8 % (ref 5–13)
NEUTS SEG # BLD: 15.1 K/UL (ref 1.8–8)
NEUTS SEG NFR BLD: 83 % (ref 32–75)
NRBC # BLD: 0 K/UL (ref 0–0.01)
NRBC BLD-RTO: 0 PER 100 WBC
PERFORMED BY, TECHID: ABNORMAL
PLATELET # BLD AUTO: 169 K/UL (ref 150–400)
PMV BLD AUTO: 11.8 FL (ref 8.9–12.9)
POTASSIUM SERPL-SCNC: 3.8 MMOL/L (ref 3.5–5.1)
PROCALCITONIN SERPL-MCNC: 0.18 NG/ML
PROT SERPL-MCNC: 7.2 G/DL (ref 6.4–8.2)
RBC # BLD AUTO: 3.4 M/UL (ref 3.8–5.2)
SODIUM SERPL-SCNC: 138 MMOL/L (ref 136–145)
TROPONIN-HIGH SENSITIVITY: 8 NG/L (ref 0–51)
VANCOMYCIN SERPL-MCNC: 7.6 UG/ML
WBC # BLD AUTO: 18.3 K/UL (ref 3.6–11)

## 2021-12-18 PROCEDURE — 74011000258 HC RX REV CODE- 258: Performed by: INTERNAL MEDICINE

## 2021-12-18 PROCEDURE — 82962 GLUCOSE BLOOD TEST: CPT

## 2021-12-18 PROCEDURE — 93005 ELECTROCARDIOGRAM TRACING: CPT

## 2021-12-18 PROCEDURE — 84145 PROCALCITONIN (PCT): CPT

## 2021-12-18 PROCEDURE — 85025 COMPLETE CBC W/AUTO DIFF WBC: CPT

## 2021-12-18 PROCEDURE — 87077 CULTURE AEROBIC IDENTIFY: CPT

## 2021-12-18 PROCEDURE — 80053 COMPREHEN METABOLIC PANEL: CPT

## 2021-12-18 PROCEDURE — G0378 HOSPITAL OBSERVATION PER HR: HCPCS

## 2021-12-18 PROCEDURE — 84484 ASSAY OF TROPONIN QUANT: CPT

## 2021-12-18 PROCEDURE — 74011250637 HC RX REV CODE- 250/637: Performed by: HOSPITALIST

## 2021-12-18 PROCEDURE — 65270000029 HC RM PRIVATE

## 2021-12-18 PROCEDURE — 74011250636 HC RX REV CODE- 250/636: Performed by: INTERNAL MEDICINE

## 2021-12-18 PROCEDURE — 99221 1ST HOSP IP/OBS SF/LOW 40: CPT | Performed by: COLON & RECTAL SURGERY

## 2021-12-18 PROCEDURE — 74011636637 HC RX REV CODE- 636/637: Performed by: HOSPITALIST

## 2021-12-18 PROCEDURE — 96376 TX/PRO/DX INJ SAME DRUG ADON: CPT

## 2021-12-18 PROCEDURE — 80202 ASSAY OF VANCOMYCIN: CPT

## 2021-12-18 PROCEDURE — 71045 X-RAY EXAM CHEST 1 VIEW: CPT

## 2021-12-18 PROCEDURE — 87205 SMEAR GRAM STAIN: CPT

## 2021-12-18 PROCEDURE — 87147 CULTURE TYPE IMMUNOLOGIC: CPT

## 2021-12-18 RX ORDER — MAGNESIUM SULFATE 100 %
4 CRYSTALS MISCELLANEOUS AS NEEDED
Status: DISCONTINUED | OUTPATIENT
Start: 2021-12-18 | End: 2021-12-20 | Stop reason: HOSPADM

## 2021-12-18 RX ORDER — SODIUM CHLORIDE 0.9 % (FLUSH) 0.9 %
5-40 SYRINGE (ML) INJECTION EVERY 8 HOURS
Status: DISCONTINUED | OUTPATIENT
Start: 2021-12-18 | End: 2021-12-20 | Stop reason: HOSPADM

## 2021-12-18 RX ORDER — DEXTROSE 50 % IN WATER (D50W) INTRAVENOUS SYRINGE
25-50 AS NEEDED
Status: DISCONTINUED | OUTPATIENT
Start: 2021-12-18 | End: 2021-12-20 | Stop reason: HOSPADM

## 2021-12-18 RX ORDER — AMOXICILLIN AND CLAVULANATE POTASSIUM 875; 125 MG/1; MG/1
1 TABLET, FILM COATED ORAL 2 TIMES DAILY
Status: ON HOLD | COMMUNITY
Start: 2021-12-14 | End: 2021-12-20 | Stop reason: SDUPTHER

## 2021-12-18 RX ORDER — SODIUM CHLORIDE 9 MG/ML
100 INJECTION, SOLUTION INTRAVENOUS CONTINUOUS
Status: DISCONTINUED | OUTPATIENT
Start: 2021-12-18 | End: 2021-12-20 | Stop reason: HOSPADM

## 2021-12-18 RX ORDER — SULFAMETHOXAZOLE AND TRIMETHOPRIM 800; 160 MG/1; MG/1
1 TABLET ORAL 2 TIMES DAILY
COMMUNITY
Start: 2021-12-14 | End: 2021-12-20

## 2021-12-18 RX ORDER — INSULIN LISPRO 100 [IU]/ML
INJECTION, SOLUTION INTRAVENOUS; SUBCUTANEOUS
Status: DISCONTINUED | OUTPATIENT
Start: 2021-12-18 | End: 2021-12-20 | Stop reason: HOSPADM

## 2021-12-18 RX ORDER — SODIUM CHLORIDE 0.9 % (FLUSH) 0.9 %
5-40 SYRINGE (ML) INJECTION AS NEEDED
Status: DISCONTINUED | OUTPATIENT
Start: 2021-12-18 | End: 2021-12-20 | Stop reason: HOSPADM

## 2021-12-18 RX ORDER — OXYCODONE HYDROCHLORIDE 5 MG/1
5 TABLET ORAL
Status: DISCONTINUED | OUTPATIENT
Start: 2021-12-18 | End: 2021-12-20 | Stop reason: HOSPADM

## 2021-12-18 RX ADMIN — Medication 10 ML: at 01:43

## 2021-12-18 RX ADMIN — OXYCODONE 5 MG: 5 TABLET ORAL at 01:43

## 2021-12-18 RX ADMIN — Medication 10 ML: at 05:22

## 2021-12-18 RX ADMIN — INSULIN LISPRO 4 UNITS: 100 INJECTION, SOLUTION INTRAVENOUS; SUBCUTANEOUS at 22:45

## 2021-12-18 RX ADMIN — PIPERACILLIN AND TAZOBACTAM 3.38 G: 3; .375 INJECTION, POWDER, LYOPHILIZED, FOR SOLUTION INTRAVENOUS at 12:14

## 2021-12-18 RX ADMIN — Medication 10 ML: at 22:45

## 2021-12-18 RX ADMIN — INSULIN LISPRO 7 UNITS: 100 INJECTION, SOLUTION INTRAVENOUS; SUBCUTANEOUS at 08:49

## 2021-12-18 RX ADMIN — AMPICILLIN SODIUM AND SULBACTAM SODIUM 1.5 G: 1; .5 INJECTION, POWDER, FOR SOLUTION INTRAMUSCULAR; INTRAVENOUS at 17:20

## 2021-12-18 RX ADMIN — SODIUM CHLORIDE 100 ML/HR: 9 INJECTION, SOLUTION INTRAVENOUS at 12:14

## 2021-12-18 NOTE — PROGRESS NOTES
Day #2 of Vancomycin  Consult provided for this 51 y.o. female for indication of sepsis, vaginal/pelvic abscess.   Antibiotic regimen:  Vancomycin + Zosyn  Concomitant nephrotoxic drugs: None  Frequency of BMP: Not scheduled    Recent Labs     21  0625 21  1517   WBC 18.3* 17.5*   CREA  --  1.00   BUN  --  8     Est CrCl: ~ ml/min; UO: Unmeasured  Temp (24hrs), Av.6 °F (37 °C), Min:98.2 °F (36.8 °C), Max:98.8 °F (37.1 °C)    Cultures:    Blood: NGTD, preliminary   Wound: Pending    MRSA Swab: N/A    Target range: AUC/ANALI 400-600    Recent level history:  Date/Time Dose & Interval Measured Level (mcg/mL) Associated AUC/ANALI    @ 0625 2000 mg x 1 7.6         Assessment/Plan:   Afebrile, tachycardic, leukocytosis, lactic acid WNL  Received 2000 mg loading dose, will initiate 1000 mg q12h for predicted AUC of 501  Will check level prior to dose  @ 1300  Antimicrobial stop date TBD

## 2021-12-18 NOTE — CONSULTS
Consult Date: 12/18/2021    Consults    Subjective   Patient is a 79-year-old female with a history of hypertension and diabetes as well as anxiety depression who is not currently on any medications because she felt that she did not need to have medications for diabetes and hypertension who presented to the emergency room complaining of abscess in the vaginal area on the left side which started 7 days ago. She was seen in the emergency department and found to have a fluctuating abscess with cellulitis. She had a CT scan which demonstrated:  IMPRESSION  1. Subcutaneous edema and fluid centered on the left mons pubis with surrounding  inflammatory stranding. This most likely represents phlegmon/organizing abscess. No definite rim enhancement to confirm well-formed abscess at this time. 2. Indeterminate lesion in the posterior right hepatic lobe. Hepatic abscess is  in the differential diagnosis. Dedicated multiphasic contrast enhanced CT or MRI  of the liver could better characterize. 3. Please see above for additional comments and chronic findings. This a.m. the patient suggests reports some ongoing pain however spontaneous drainage. The patient did have a sustained run of SVT with heart rate up to 170, rapid response was called. Reported chest pain as well however she now states is resolved. Currently heart rate is back down and she is in no hemodynamic instability.       Past Medical History:   Diagnosis Date    Anxiety     Depression     Diabetes ([address]     Hypertension     Sciatica of left side     Sleep apnea       Past Surgical History:   Procedure Laterality Date    HX BREAST REDUCTION      HX CHOLECYSTECTOMY      HX TUBAL LIGATION       Family History   Problem Relation Age of Onset    Heart Disease Mother     Hypertension Mother     Stroke Mother     Heart Attack Mother     Diabetes Mother     Diabetes Father     Cancer Maternal Aunt         breast cancer      Social History Tobacco Use    Smoking status: Never Smoker    Smokeless tobacco: Never Used   Substance Use Topics    Alcohol use: No     Comment: rarely       Current Facility-Administered Medications   Medication Dose Route Frequency Provider Last Rate Last Admin    sodium chloride (NS) flush 5-40 mL  5-40 mL IntraVENous Q8H Denton Davila MD   10 mL at 12/18/21 0522    sodium chloride (NS) flush 5-40 mL  5-40 mL IntraVENous PRN Denton Davila MD        acetaminophen (TYLENOL) solution 650 mg  650 mg Oral Q4H PRN Denton Davila MD        oxyCODONE IR (ROXICODONE) tablet 5 mg  5 mg Oral Q4H PRN Denton Davila MD   5 mg at 12/18/21 0143    insulin lispro (HUMALOG) injection   SubCUTAneous AC&HS Denton Davila MD   7 Units at 12/18/21 0849    glucose chewable tablet 16 g  4 Tablet Oral PRN Denton Davila MD        dextrose (D50W) injection syrg 12.5-25 g  25-50 mL IntraVENous PRN Denton Davila MD        glucagon (GLUCAGEN) injection 1 mg  1 mg IntraMUSCular PRN Denton Davila MD        piperacillin-tazobactam (ZOSYN) 3.375 g in 0.9% sodium chloride (MBP/ADV) 100 mL MBP  3.375 g IntraVENous Q8H Cee Jackson MD 25 mL/hr at 12/18/21 1214 3.375 g at 12/18/21 1214    0.9% sodium chloride infusion  100 mL/hr IntraVENous CONTINUOUS Cee Jackson  mL/hr at 12/18/21 1214 100 mL/hr at 12/18/21 1214    vancomycin (VANCOCIN) 1,000 mg in 0.9% sodium chloride 250 mL (VIAL-MATE)  1,000 mg IntraVENous Q12H Denton Davila MD       [facility] [START ON 12/20/2021] Vancomycin - Trough to be drawn prior to dose 12/20 @ 1300   Other ONCE Denton Davila MD        Vancomycin dose per pharmacy policy   Other Rx Dosing/Monitoring Ajit YOUNG, DO            Review of Systems   All other systems reviewed and are negative.       Objective     Vital signs for last 24 hours:  Visit Vitals  /60   Pulse (!) 109   Temp 98.8 °F (37.1 °C)   Resp 16 Ht 5' 7\" (1.702 m)   Wt 280 lb (127 kg)   LMP 12/01/2021 (Exact Date)   SpO2 100%   BMI 43.85 kg/m²       Intake/Output this shift:  Current Shift: No intake/output data recorded. Last 3 Shifts: No intake/output data recorded. Data Review:   Recent Results (from the past 24 hour(s))   CBC WITH AUTOMATED DIFF    Collection Time: 12/17/21  3:17 PM   Result Value Ref Range    WBC 17.5 (H) 3.6 - 11.0 K/uL    RBC 3.60 (L) 3.80 - 5.20 M/uL    HGB 10.8 (L) 11.5 - 16.0 g/dL    HCT 32.0 (L) 35.0 - 47.0 %    MCV 88.9 80.0 - 99.0 FL    MCH 30.0 26.0 - 34.0 PG    MCHC 33.8 30.0 - 36.5 g/dL    RDW 12.5 11.5 - 14.5 %    PLATELET 739 463 - 799 K/uL    MPV 11.8 8.9 - 12.9 FL    NEUTROPHILS 86 (H) 32 - 75 %    LYMPHOCYTES 6 (L) 12 - 49 %    MONOCYTES 7 5 - 13 %    EOSINOPHILS 0 0 - 7 %    BASOPHILS 0 0 - 1 %    IMMATURE GRANULOCYTES 1 (H) 0.0 - 0.5 %    ABS. NEUTROPHILS 15.0 (H) 1.8 - 8.0 K/UL    ABS. LYMPHOCYTES 1.1 0.8 - 3.5 K/UL    ABS. MONOCYTES 1.3 (H) 0.0 - 1.0 K/UL    ABS. EOSINOPHILS 0.0 0.0 - 0.4 K/UL    ABS. BASOPHILS 0.0 0.0 - 0.1 K/UL    ABS. IMM.  GRANS. 0.1 (H) 0.00 - 0.04 K/UL    DF AUTOMATED     METABOLIC PANEL, BASIC    Collection Time: 12/17/21  3:17 PM   Result Value Ref Range    Sodium 137 136 - 145 mmol/L    Potassium 3.9 3.5 - 5.1 mmol/L    Chloride 99 97 - 108 mmol/L    CO2 26 21 - 32 mmol/L    Anion gap 12 5 - 15 mmol/L    Glucose 311 (H) 65 - 100 mg/dL    BUN 8 6 - 20 mg/dL    Creatinine 1.00 0.55 - 1.02 mg/dL    BUN/Creatinine ratio 8 (L) 12 - 20      GFR est AA >60 >60 ml/min/1.73m2    GFR est non-AA 59 (L) >60 ml/min/1.73m2    Calcium 8.8 8.5 - 10.1 mg/dL   LACTIC ACID    Collection Time: 12/17/21  3:17 PM   Result Value Ref Range    Lactic acid 1.1 0.4 - 2.0 mmol/L   CULTURE, BLOOD, PAIRED    Collection Time: 12/17/21  3:17 PM    Specimen: Blood   Result Value Ref Range    Special Requests: No Special Requests      Culture result: No growth after 2 hours     VANCOMYCIN, RANDOM    Collection Time: 12/18/21  6:25 AM   Result Value Ref Range    Vancomycin, random 7.6 ug/mL   METABOLIC PANEL, COMPREHENSIVE    Collection Time: 12/18/21  6:25 AM   Result Value Ref Range    Sodium 138 136 - 145 mmol/L    Potassium 3.8 3.5 - 5.1 mmol/L    Chloride 106 97 - 108 mmol/L    CO2 23 21 - 32 mmol/L    Anion gap 9 5 - 15 mmol/L    Glucose 299 (H) 65 - 100 mg/dL    BUN 6 6 - 20 mg/dL    Creatinine 0.82 0.55 - 1.02 mg/dL    BUN/Creatinine ratio 7 (L) 12 - 20      GFR est AA >60 >60 ml/min/1.73m2    GFR est non-AA >60 >60 ml/min/1.73m2    Calcium 8.7 8.5 - 10.1 mg/dL    Bilirubin, total 0.6 0.2 - 1.0 mg/dL    AST (SGOT) 6 (L) 15 - 37 U/L    ALT (SGPT) 12 12 - 78 U/L    Alk. phosphatase 117 45 - 117 U/L    Protein, total 7.2 6.4 - 8.2 g/dL    Albumin 2.3 (L) 3.5 - 5.0 g/dL    Globulin 4.9 (H) 2.0 - 4.0 g/dL    A-G Ratio 0.5 (L) 1.1 - 2.2     CBC WITH AUTOMATED DIFF    Collection Time: 12/18/21  6:25 AM   Result Value Ref Range    WBC 18.3 (H) 3.6 - 11.0 K/uL    RBC 3.40 (L) 3.80 - 5.20 M/uL    HGB 10.1 (L) 11.5 - 16.0 g/dL    HCT 31.5 (L) 35.0 - 47.0 %    MCV 92.6 80.0 - 99.0 FL    MCH 29.7 26.0 - 34.0 PG    MCHC 32.1 30.0 - 36.5 g/dL    RDW 12.9 11.5 - 14.5 %    PLATELET 962 215 - 904 K/uL    MPV 11.8 8.9 - 12.9 FL    NRBC 0.0 0.0  WBC    ABSOLUTE NRBC 0.00 0.00 - 0.01 K/uL    NEUTROPHILS 83 (H) 32 - 75 %    LYMPHOCYTES 8 (L) 12 - 49 %    MONOCYTES 8 5 - 13 %    EOSINOPHILS 0 0 - 7 %    BASOPHILS 0 0 - 1 %    IMMATURE GRANULOCYTES 1 (H) 0 - 0.5 %    ABS. NEUTROPHILS 15.1 (H) 1.8 - 8.0 K/UL    ABS. LYMPHOCYTES 1.4 0.8 - 3.5 K/UL    ABS. MONOCYTES 1.5 (H) 0.0 - 1.0 K/UL    ABS. EOSINOPHILS 0.0 0.0 - 0.4 K/UL    ABS. BASOPHILS 0.0 0.0 - 0.1 K/UL    ABS. IMM.  GRANS. 0.2 (H) 0.00 - 0.04 K/UL    DF AUTOMATED     PROCALCITONIN    Collection Time: 12/18/21  6:25 AM   Result Value Ref Range    Procalcitonin 0.18 (H) 0 ng/mL   GLUCOSE, POC    Collection Time: 12/18/21  8:28 AM   Result Value Ref Range    Glucose (POC) 273 (H) 65 - 117 mg/dL    Performed by Betty Degroot    GLUCOSE, POC    Collection Time: 12/18/21 11:33 AM   Result Value Ref Range    Glucose (POC) 268 (H) 65 - 117 mg/dL    Performed by Mary Jane        Physical Exam  Genitourinary:         Comments: Area of induration and swelling spontaneously draining        Assessment and plan:  Patient complaining of vaginal abscess/abscess originating in the labial area which then went up to the mons pubis. Recommend consulting gynecology for evaluation. In terms of rapid response have ordered a set of troponins as well as a twelve-lead EKG, hospital service to follow-up.     Continue IV antibiotics    Follow-up repeat abdominal imaging to evaluate for possible liver abscess

## 2021-12-18 NOTE — PROGRESS NOTES
Vancomycin Note-12/17/2021    Admission date [id number]   Attending Sada Stewart   Indication SSTI        Height Ht Readings from Last 1 Encounters:   12/17/21 170.2 cm (67\")       Weight Wt Readings from Last 1 Encounters:   12/17/21 127 kg (280 lb)      IBW ideal body weight      SCr Creatinine   Date Value Ref Range Status   12/17/2021 1.00 0.55 - 1.02 mg/dL Final   06/20/2019 0.70 0.57 - 1.00 mg/dL Final   05/02/2018 0.68 0.57 - 1.00 mg/dL Final      CrCl (based on IBW) 67.6 ml/min       Load/ER dose 2000mg x1 12/17     New start       NEW regimen Will schedule regimen once patient transfer to main hospital   Level Scheduled for AM 12/18         Current Antimicrobial Therapy (168h ago, onward)       Ordered     Start Stop    12/17/21 2022  vancomycin (VANCOCIN) 2,000 mg in 0.9% sodium chloride 500 mL IVPB  2,000 mg,   IntraVENous,   ONCE        References:    Clarice    12/17/21 2100 12/18/21 0859    12/17/21 2022  Vancomycin dose per pharmacy policy  Other,   RX DOSING/MONITORING        References:    Clarice    12/17/21 2022 --                      Submitted by:  Lewis Daniel, JULIEN

## 2021-12-18 NOTE — PROGRESS NOTES
Consult    NAME: Tony Mayorga   :  1975   MRN:  [id number]     Date/Time:  2021 1:26 PM    Patient PCP: Bridgette Rajput DO  ________________________________________________________________________     Assessment:   Primary cardiologist: None    PROBLEM LIST:   1. Medication noncompliance  2. Vaginal abscess/cellulitis  3. Sepsis  4. Chest pain  5. Paroxysmal supraventricular tachycardia  6. Hypertension  7. Dyslipidemia  8. Type 2 diabetes  9. Family history of premature coronary disease (mother with CAD at age 48)  8. Obstructive sleep apnea requiring CPAP    11. Severe obesity (BMI =43.85 kg/m² )  12. .  Anxiety  13. Depression   14. Leukocytosis  15. Anemia        []        High complexity decision making was performed        Subjective:   CHIEF COMPLAINT:     HISTORY OF PRESENT ILLNESS:     This 79-year-old -American female with no known coronary disease presents for evaluation of vaginal abscess/cellulitis. The patient indicates that for approximately a week she has been treated as an outpatient with antibiotics for a vaginal \"boil\". Because her condition worsen she presented to the emergency department. In the emergency department the patient was determined to have a vaginal abscess and cellulitis. The patient was therefore being considered for surgical drainage. Earlier today the patient complained of chest pain. There was accompanying palpitation. \"Rapid response\" was called. The patient was noted to be in sustained paroxysmal supraventricular tachycardia. There was accompanying difficulty breathing. Fortunately, the patient spontaneously \"broke\". Cardiology is now consulted to assist in evaluation and management.         Past Medical History:   Diagnosis Date    Anxiety     Depression     Diabetes ([address]     Hypertension     Sciatica of left side     Sleep apnea       Past Surgical History:   Procedure Laterality Date    HX BREAST REDUCTION  HX CHOLECYSTECTOMY      HX TUBAL LIGATION       No Known Allergies   Meds:  See below  Social History     Tobacco Use    Smoking status: Never Smoker    Smokeless tobacco: Never Used   Substance Use Topics    Alcohol use: No     Comment: rarely      Family History   Problem Relation Age of Onset    Heart Disease Mother     Hypertension Mother     Stroke Mother     Heart Attack Mother     Diabetes Mother     Diabetes Father     Cancer Maternal Aunt         breast cancer       REVIEW OF SYSTEMS:    As above, otherwise noncontributory.:    Objective:      Physical Exam:    Last 24hrs VS reviewed since prior progress note. Most recent are:    Visit Vitals  /60   Pulse (!) 109   Temp 98.8 °F (37.1 °C)   Resp 16   Ht 5' 7\" (1.702 m)   Wt 127 kg (280 lb)   LMP 12/01/2021 (Exact Date)   SpO2 100%   BMI 43.85 kg/m²     No intake or output data in the 24 hours ending 12/18/21 1326     General Appearance: Well developed, severely obese, in no acute respiratory distress. Ears/Nose/Mouth/Throat: Atraumatic, normocephalic. Neck: Supple. JVP within normal limits. Carotids good upstrokes, with no bruit. Chest: Lungs clear to auscultation bilaterally. Cardiovascular: JVP is not elevated, PMI is not attempted, normal intensity S1-S2, without S3. There are no ectopic beats. Abdomen: Soft, non-tender, bowel sounds are active. Extremities: No edema bilaterally. Skin: Warm and dry. Neuro: CN II-XII grossly intact, without gross motor/sensory deficit. Data:      Telemetry:    EKG:  []  No new EKG for review  XR CHEST PORT   Final Result   No acute cardiopulmonary findings. CT ABD PELV W CONT   Final Result   1. Subcutaneous edema and fluid centered on the left mons pubis with surrounding   inflammatory stranding. This most likely represents phlegmon/organizing abscess. No definite rim enhancement to confirm well-formed abscess at this time.    2. Indeterminate lesion in the posterior right hepatic lobe. Hepatic abscess is   in the differential diagnosis. Dedicated multiphasic contrast enhanced CT or MRI   of the liver could better characterize. 3. Please see above for additional comments and chronic findings. MRI ABD W WO CONT    (Results Pending)        Prior to Admission medications    Medication Sig Start Date End Date Taking? Authorizing Provider   amoxicillin-clavulanate (AUGMENTIN) 875-125 mg per tablet Take 1 Tablet by mouth two (2) times a day. 12/14/21   Provider, Historical   trimethoprim-sulfamethoxazole (BACTRIM DS, SEPTRA DS) 160-800 mg per tablet Take 1 Tablet by mouth two (2) times a day. 12/14/21   Provider, Historical       Recent Results (from the past 24 hour(s))   CBC WITH AUTOMATED DIFF    Collection Time: 12/17/21  3:17 PM   Result Value Ref Range    WBC 17.5 (H) 3.6 - 11.0 K/uL    RBC 3.60 (L) 3.80 - 5.20 M/uL    HGB 10.8 (L) 11.5 - 16.0 g/dL    HCT 32.0 (L) 35.0 - 47.0 %    MCV 88.9 80.0 - 99.0 FL    MCH 30.0 26.0 - 34.0 PG    MCHC 33.8 30.0 - 36.5 g/dL    RDW 12.5 11.5 - 14.5 %    PLATELET 030 546 - 292 K/uL    MPV 11.8 8.9 - 12.9 FL    NEUTROPHILS 86 (H) 32 - 75 %    LYMPHOCYTES 6 (L) 12 - 49 %    MONOCYTES 7 5 - 13 %    EOSINOPHILS 0 0 - 7 %    BASOPHILS 0 0 - 1 %    IMMATURE GRANULOCYTES 1 (H) 0.0 - 0.5 %    ABS. NEUTROPHILS 15.0 (H) 1.8 - 8.0 K/UL    ABS. LYMPHOCYTES 1.1 0.8 - 3.5 K/UL    ABS. MONOCYTES 1.3 (H) 0.0 - 1.0 K/UL    ABS. EOSINOPHILS 0.0 0.0 - 0.4 K/UL    ABS. BASOPHILS 0.0 0.0 - 0.1 K/UL    ABS. IMM.  GRANS. 0.1 (H) 0.00 - 0.04 K/UL    DF AUTOMATED     METABOLIC PANEL, BASIC    Collection Time: 12/17/21  3:17 PM   Result Value Ref Range    Sodium 137 136 - 145 mmol/L    Potassium 3.9 3.5 - 5.1 mmol/L    Chloride 99 97 - 108 mmol/L    CO2 26 21 - 32 mmol/L    Anion gap 12 5 - 15 mmol/L    Glucose 311 (H) 65 - 100 mg/dL    BUN 8 6 - 20 mg/dL    Creatinine 1.00 0.55 - 1.02 mg/dL    BUN/Creatinine ratio 8 (L) 12 - 20      GFR est AA >60 >60 ml/min/1.73m2    GFR est non-AA 59 (L) >60 ml/min/1.73m2    Calcium 8.8 8.5 - 10.1 mg/dL   LACTIC ACID    Collection Time: 12/17/21  3:17 PM   Result Value Ref Range    Lactic acid 1.1 0.4 - 2.0 mmol/L   CULTURE, BLOOD, PAIRED    Collection Time: 12/17/21  3:17 PM    Specimen: Blood   Result Value Ref Range    Special Requests: No Special Requests      Culture result: No growth after 2 hours     VANCOMYCIN, RANDOM    Collection Time: 12/18/21  6:25 AM   Result Value Ref Range    Vancomycin, random 7.6 ug/mL   METABOLIC PANEL, COMPREHENSIVE    Collection Time: 12/18/21  6:25 AM   Result Value Ref Range    Sodium 138 136 - 145 mmol/L    Potassium 3.8 3.5 - 5.1 mmol/L    Chloride 106 97 - 108 mmol/L    CO2 23 21 - 32 mmol/L    Anion gap 9 5 - 15 mmol/L    Glucose 299 (H) 65 - 100 mg/dL    BUN 6 6 - 20 mg/dL    Creatinine 0.82 0.55 - 1.02 mg/dL    BUN/Creatinine ratio 7 (L) 12 - 20      GFR est AA >60 >60 ml/min/1.73m2    GFR est non-AA >60 >60 ml/min/1.73m2    Calcium 8.7 8.5 - 10.1 mg/dL    Bilirubin, total 0.6 0.2 - 1.0 mg/dL    AST (SGOT) 6 (L) 15 - 37 U/L    ALT (SGPT) 12 12 - 78 U/L    Alk. phosphatase 117 45 - 117 U/L    Protein, total 7.2 6.4 - 8.2 g/dL    Albumin 2.3 (L) 3.5 - 5.0 g/dL    Globulin 4.9 (H) 2.0 - 4.0 g/dL    A-G Ratio 0.5 (L) 1.1 - 2.2     CBC WITH AUTOMATED DIFF    Collection Time: 12/18/21  6:25 AM   Result Value Ref Range    WBC 18.3 (H) 3.6 - 11.0 K/uL    RBC 3.40 (L) 3.80 - 5.20 M/uL    HGB 10.1 (L) 11.5 - 16.0 g/dL    HCT 31.5 (L) 35.0 - 47.0 %    MCV 92.6 80.0 - 99.0 FL    MCH 29.7 26.0 - 34.0 PG    MCHC 32.1 30.0 - 36.5 g/dL    RDW 12.9 11.5 - 14.5 %    PLATELET 569 417 - 771 K/uL    MPV 11.8 8.9 - 12.9 FL    NRBC 0.0 0.0  WBC    ABSOLUTE NRBC 0.00 0.00 - 0.01 K/uL    NEUTROPHILS 83 (H) 32 - 75 %    LYMPHOCYTES 8 (L) 12 - 49 %    MONOCYTES 8 5 - 13 %    EOSINOPHILS 0 0 - 7 %    BASOPHILS 0 0 - 1 %    IMMATURE GRANULOCYTES 1 (H) 0 - 0.5 %    ABS. NEUTROPHILS 15.1 (H) 1.8 - 8.0 K/UL    ABS.  LYMPHOCYTES 1.4 0.8 - 3.5 K/UL    ABS. MONOCYTES 1.5 (H) 0.0 - 1.0 K/UL    ABS. EOSINOPHILS 0.0 0.0 - 0.4 K/UL    ABS. BASOPHILS 0.0 0.0 - 0.1 K/UL    ABS. IMM. GRANS. 0.2 (H) 0.00 - 0.04 K/UL    DF AUTOMATED     PROCALCITONIN    Collection Time: 12/18/21  6:25 AM   Result Value Ref Range    Procalcitonin 0.18 (H) 0 ng/mL   GLUCOSE, POC    Collection Time: 12/18/21  8:28 AM   Result Value Ref Range    Glucose (POC) 273 (H) 65 - 117 mg/dL    Performed by Fal[facility]    GLUCOSE, POC    Collection Time: 12/18/21 11:33 AM   Result Value Ref Range    Glucose (POC) 268 (H) 65 - 117 mg/dL    Performed by [address] [place] [address] Drive:   1. Continue telemetry monitor for 24-48 hours  2. Obtain serial cardiac enzymes  3. Monitor serum electrolytes, and renal function  4. Obtain complete 2D echocardiogram  5. Continue current cardiovascular medications including insulin therapy    6.   Further recommendations depending on above results, and clinical course   Davie Hudson MD

## 2021-12-18 NOTE — PROGRESS NOTES
Hospitalist Progress Note               Daily Progress Note: 12/18/2021      Subjective:   Hospital course to date: Patient is a 79-year-old female with a history of diabetes mellitus, not on any medication as well as hypertension who presented to the ED on the evening of 12/17 complaining of an abscess in the left-sided vaginal area for the past week. She had been seen at an urgent care center recently and was given Augmentin and Bactrim which she took without improvement. CT showed a large abscess centered on the left mons pubis measuring 4.0 x 4.1 x 9.4 cm. She was admitted, started on IV antibiotics with plans for surgical drainage    She was also found to have a nonspecific hypodense lesion in the posterior right hepatic lobe measuring 5.1 x 4.4 x 3.7 cm, with differential including hepatic abscess. She denies any abdominal pain.   Still very uncomfortable in the vaginal area    Problem List:  Problem List as of 12/18/2021 Date Reviewed: 12/18/2021          Codes Class Noted - Resolved    Abscess ICD-10-CM: L02.91  ICD-9-CM: 682.9  12/17/2021 - Present        Cellulitis ICD-10-CM: L03.90  ICD-9-CM: 682.9  12/17/2021 - Present        Sepsis ([facility] ICD-10-CM: A41.9  ICD-9-CM: 038.9, 995.91  12/17/2021 - Present        Obesity, morbid ([facility] ICD-10-CM: E66.01  ICD-9-CM: 278.01  5/2/2018 - Present        Type 2 diabetes mellitus without complication ([facility] APX-54-SC: E11.9  ICD-9-CM: 250.00  3/9/2016 - Present        Essential hypertension ICD-10-CM: I10  ICD-9-CM: 401.9  3/9/2016 - Present              Medications reviewed  Current Facility-Administered Medications   Medication Dose Route Frequency    sodium chloride (NS) flush 5-40 mL  5-40 mL IntraVENous Q8H    sodium chloride (NS) flush 5-40 mL  5-40 mL IntraVENous PRN    acetaminophen (TYLENOL) solution 650 mg  650 mg Oral Q4H PRN    oxyCODONE IR (ROXICODONE) tablet 5 mg  5 mg Oral Q4H PRN    insulin lispro (HUMALOG) injection   SubCUTAneous AC&HS    glucose chewable tablet 16 g  4 Tablet Oral PRN    dextrose (D50W) injection syrg 12.5-25 g  25-50 mL IntraVENous PRN    glucagon (GLUCAGEN) injection 1 mg  1 mg IntraMUSCular PRN    Vancomycin dose per pharmacy policy   Other Rx Dosing/Monitoring       Review of Systems:   A comprehensive review of systems was negative except for that written in the HPI. Objective:   Physical Exam:     Visit Vitals  /60   Pulse (!) 109   Temp 98.8 °F (37.1 °C)   Resp 16   Ht 5' 7\" (1.702 m)   Wt 127 kg (280 lb)   LMP 2021 (Exact Date)   SpO2 100%   BMI 43.85 kg/m²      O2 Device: None    Temp (24hrs), Av.6 °F (37 °C), Min:98.1 °F (36.7 °C), Max:98.8 °F (37.1 °C)    No intake/output data recorded. No intake/output data recorded. General:   Awake and alert   Lungs:   Clear to auscultation bilaterally. Chest wall:  No tenderness or deformity. Heart:  Regular rate and rhythm, S1, S2 normal, no murmur, click, rub or gallop. Abdomen:   Soft, non-tender. Bowel sounds normal. No masses,  No organomegaly. Extremities: Extremities normal, atraumatic, no cyanosis or edema. Pulses: 2+ and symmetric all extremities. Skin: Skin color, texture, turgor normal. No rashes or lesions   Neurologic: CNII-XII intact. No gross focal deficits         Data Review:       Recent Days:  Recent Labs     21  1517   WBC 17.5*   HGB 10.8*   HCT 32.0*        Recent Labs     21  1517      K 3.9   CL 99   CO2 26   *   BUN 8   CREA 1.00   CA 8.8     No results for input(s): PH, PCO2, PO2, HCO3, FIO2 in the last 72 hours.     24 Hour Results:  Recent Results (from the past 24 hour(s))   GLUCOSE, POC    Collection Time: 21 11:55 AM   Result Value Ref Range    Glucose (POC) 313 (H) 65 - 117 mg/dL    Performed by Samantha Mills    CBC WITH AUTOMATED DIFF    Collection Time: 21  3:17 PM   Result Value Ref Range    WBC 17.5 (H) 3.6 - 11.0 K/uL    RBC 3.60 (L) 3.80 - 5.20 M/uL HGB 10.8 (L) 11.5 - 16.0 g/dL    HCT 32.0 (L) 35.0 - 47.0 %    MCV 88.9 80.0 - 99.0 FL    MCH 30.0 26.0 - 34.0 PG    MCHC 33.8 30.0 - 36.5 g/dL    RDW 12.5 11.5 - 14.5 %    PLATELET 310 229 - 358 K/uL    MPV 11.8 8.9 - 12.9 FL    NEUTROPHILS 86 (H) 32 - 75 %    LYMPHOCYTES 6 (L) 12 - 49 %    MONOCYTES 7 5 - 13 %    EOSINOPHILS 0 0 - 7 %    BASOPHILS 0 0 - 1 %    IMMATURE GRANULOCYTES 1 (H) 0.0 - 0.5 %    ABS. NEUTROPHILS 15.0 (H) 1.8 - 8.0 K/UL    ABS. LYMPHOCYTES 1.1 0.8 - 3.5 K/UL    ABS. MONOCYTES 1.3 (H) 0.0 - 1.0 K/UL    ABS. EOSINOPHILS 0.0 0.0 - 0.4 K/UL    ABS. BASOPHILS 0.0 0.0 - 0.1 K/UL    ABS. IMM. GRANS. 0.1 (H) 0.00 - 0.04 K/UL    DF AUTOMATED     METABOLIC PANEL, BASIC    Collection Time: 12/17/21  3:17 PM   Result Value Ref Range    Sodium 137 136 - 145 mmol/L    Potassium 3.9 3.5 - 5.1 mmol/L    Chloride 99 97 - 108 mmol/L    CO2 26 21 - 32 mmol/L    Anion gap 12 5 - 15 mmol/L    Glucose 311 (H) 65 - 100 mg/dL    BUN 8 6 - 20 mg/dL    Creatinine 1.00 0.55 - 1.02 mg/dL    BUN/Creatinine ratio 8 (L) 12 - 20      GFR est AA >60 >60 ml/min/1.73m2    GFR est non-AA 59 (L) >60 ml/min/1.73m2    Calcium 8.8 8.5 - 10.1 mg/dL   LACTIC ACID    Collection Time: 12/17/21  3:17 PM   Result Value Ref Range    Lactic acid 1.1 0.4 - 2.0 mmol/L   VANCOMYCIN, RANDOM    Collection Time: 12/18/21  6:25 AM   Result Value Ref Range    Vancomycin, random 7.6 ug/mL   GLUCOSE, POC    Collection Time: 12/18/21  8:28 AM   Result Value Ref Range    Glucose (POC) 273 (H) 65 - 117 mg/dL    Performed by Kendall Denton        CT ABD PELV W CONT   Final Result   1. Subcutaneous edema and fluid centered on the left mons pubis with surrounding   inflammatory stranding. This most likely represents phlegmon/organizing abscess. No definite rim enhancement to confirm well-formed abscess at this time. 2. Indeterminate lesion in the posterior right hepatic lobe. Hepatic abscess is   in the differential diagnosis.  Dedicated multiphasic contrast enhanced CT or MRI   of the liver could better characterize. 3. Please see above for additional comments and chronic findings. Assessment:  Large left vaginal/pelvic abscess    Hypodense lesion right hepatic lobe, rule out abscess although clinically not highly suspicious    Sepsis with leukocytosis and tachycardia, present on admission    Diabetes mellitus, uncontrolled    Morbid obesity    Obstructive sleep apnea      Plan:  Continue vancomycin  Add Zosyn  Obtain MRI of liver with and without contrast  Await surgical input  Recheck labs now including liver chemistries and procalcitonin    Care Plan discussed with: Patient/Family    Disposition: Continued inpatient stay    Total time spent with patient: 30 minutes.     Ivonne Phillips MD

## 2021-12-18 NOTE — H&P
History and Physical              Subjective :   Chief Complaint : Abscess in the vaginal area    Source of information : Patient, ED provider    History of present illness:   51 y.o. female with history of diabetes not taking any medications as she felt not needed, hypertension, depression presents to the emergency room complaining of an abscess on vaginal area left-sided started 7 days ago getting worse. Now having trouble walking, and it is growing in size. She also feels feverish and chills. Denies any nausea or vomiting. She was evaluated before at an urgent care center and was given antibiotics Augmentin and Bactrim, taking with no improvement. On evaluation found with fluctuating abscess with cellulitis around. Evaluated by CT scan and discussed with general surgery, recommended incision and drainage by the general surgery. She is started on IV antibiotics. Found with uncontrolled blood pressure  On arrival to the emergency room that is better later, uncontrolled blood sugars on the laboratory data. Past Medical History:   Diagnosis Date    Anxiety     Depression     Diabetes ([address]     Hypertension     Sciatica of left side     Sleep apnea      Past Surgical History:   Procedure Laterality Date    HX BREAST REDUCTION      HX CHOLECYSTECTOMY      HX TUBAL LIGATION       Family History   Problem Relation Age of Onset    Heart Disease Mother     Hypertension Mother     Stroke Mother     Heart Attack Mother     Diabetes Mother     Diabetes Father     Cancer Maternal Aunt         breast cancer      Social History     Tobacco Use    Smoking status: Never Smoker    Smokeless tobacco: Never Used   Substance Use Topics    Alcohol use: No     Comment: rarely       Prior to Admission medications    Medication Sig Start Date End Date Taking? Authorizing Provider   amoxicillin-clavulanate (AUGMENTIN) 875-125 mg per tablet Take 1 Tablet by mouth two (2) times a day.  12/14/21   Provider, Historical   trimethoprim-sulfamethoxazole (BACTRIM DS, SEPTRA DS) 160-800 mg per tablet Take 1 Tablet by mouth two (2) times a day. 12/14/21   Provider, Historical     No Known Allergies          Review of Systems:  Constitutional: Appetite is good, denies weight loss, no fever, no chills, no night sweats. Eye: No recent visual disturbances, no discharge, no double vision. Ear/nose/mouth/throat : No hearing disturbance, no ear pain, no nasal congestion, no sore throat, no trouble swallowing. Respiratory : No trouble breathing, no cough, ++ shortness of breath with exertion, no hemoptysis, no wheezing. Cardiovascular : No chest pain, no palpitation, no racing of heart, no orthopnea, no paroxysmal nocturnal dyspnea, no peripheral edema. Gastrointestinal : No nausea, no vomiting, no diarrhea, No constipation, No heartburn, No abdominal pain. Genitourinary : No dysuria, no hematuria, states menstrual cycles are regular. Lymphatics : No swollen glands -Neck, axillary, inguinal.  Endocrine : No excessive thirst, No polyuria not monitoring blood sugars. Immunologic : No hives, urticaria, No seasonal allergies. Musculoskeletal : No joint swelling, No pain, No effusion,   Integumentary : As in history of present illness  Hematology : No petechiae, No easy bruising,  No tendency to bleed easy. Neurology : Denies change in mental status, No headache, No confusion, No numbness or tingling. Psychiatric : No mood swings, No anxiety, No depression.     Vitals:     Patient Vitals for the past 12 hrs:   Temp Pulse Resp BP SpO2   12/18/21 0045 98.7 °F (37.1 °C) (!) 131 18 131/75 99 %   12/17/21 2309 98.8 °F (37.1 °C) (!) 120 18 (!) 142/75 98 %   12/17/21 2216 98.7 °F (37.1 °C) (!) 118 18 (!) 136/95 97 %   12/17/21 1923 98.2 °F (36.8 °C) (!) 111 18 (!) 185/88 97 %   12/17/21 1724  (!) 112 18 (!) 145/87 100 %       Physical Exam:   General : Morbidly obese, looks tired but not in any distress  HEENT : KISHOR, normal oral mucosa, atraumatic normocephalic, Normal ear and nose. Neck : Supple, no JVD, no masses noted, no carotid bruit. Lungs : Breath sounds with good air entry bilaterally, no wheezes or rales, no accessory muscle use. CVS : Rhythm rate regular, S1+, S2+, no murmur or gallop. Abdomen : Soft, nontender, no organomegaly, bowel sounds active. Extremities : No edema noted,  pedal pulses palpable. Musculoskeletal : Fair range of motion, no joint swelling or effusion, muscle tone and power appears normal.   Skin : As she was already evaluated by the emergency physician and due to the location I did not try to examine again. Lymphatic : No cervical lymphadenopathy. Neurological : Awake, alert, oriented to time place person. No neurological deficits. Psychiatric : Mood and affect appears appropriate to the situation. Data Review:   Recent Results (from the past 24 hour(s))   GLUCOSE, POC    Collection Time: 12/17/21 11:55 AM   Result Value Ref Range    Glucose (POC) 313 (H) 65 - 117 mg/dL    Performed by Samantha Mills    CBC WITH AUTOMATED DIFF    Collection Time: 12/17/21  3:17 PM   Result Value Ref Range    WBC 17.5 (H) 3.6 - 11.0 K/uL    RBC 3.60 (L) 3.80 - 5.20 M/uL    HGB 10.8 (L) 11.5 - 16.0 g/dL    HCT 32.0 (L) 35.0 - 47.0 %    MCV 88.9 80.0 - 99.0 FL    MCH 30.0 26.0 - 34.0 PG    MCHC 33.8 30.0 - 36.5 g/dL    RDW 12.5 11.5 - 14.5 %    PLATELET 294 376 - 731 K/uL    MPV 11.8 8.9 - 12.9 FL    NEUTROPHILS 86 (H) 32 - 75 %    LYMPHOCYTES 6 (L) 12 - 49 %    MONOCYTES 7 5 - 13 %    EOSINOPHILS 0 0 - 7 %    BASOPHILS 0 0 - 1 %    IMMATURE GRANULOCYTES 1 (H) 0.0 - 0.5 %    ABS. NEUTROPHILS 15.0 (H) 1.8 - 8.0 K/UL    ABS. LYMPHOCYTES 1.1 0.8 - 3.5 K/UL    ABS. MONOCYTES 1.3 (H) 0.0 - 1.0 K/UL    ABS. EOSINOPHILS 0.0 0.0 - 0.4 K/UL    ABS. BASOPHILS 0.0 0.0 - 0.1 K/UL    ABS. IMM.  GRANS. 0.1 (H) 0.00 - 0.04 K/UL    DF AUTOMATED     METABOLIC PANEL, BASIC    Collection Time: 12/17/21  3:17 PM   Result Value Ref Range    Sodium 137 136 - 145 mmol/L    Potassium 3.9 3.5 - 5.1 mmol/L    Chloride 99 97 - 108 mmol/L    CO2 26 21 - 32 mmol/L    Anion gap 12 5 - 15 mmol/L    Glucose 311 (H) 65 - 100 mg/dL    BUN 8 6 - 20 mg/dL    Creatinine 1.00 0.55 - 1.02 mg/dL    BUN/Creatinine ratio 8 (L) 12 - 20      GFR est AA >60 >60 ml/min/1.73m2    GFR est non-AA 59 (L) >60 ml/min/1.73m2    Calcium 8.8 8.5 - 10.1 mg/dL   LACTIC ACID    Collection Time: 12/17/21  3:17 PM   Result Value Ref Range    Lactic acid 1.1 0.4 - 2.0 mmol/L       Radiologic Studies :   CT Results  (Last 48 hours)               12/17/21 1638  CT ABD PELV W CONT Final result    Impression:  1. Subcutaneous edema and fluid centered on the left mons pubis with surrounding   inflammatory stranding. This most likely represents phlegmon/organizing abscess. No definite rim enhancement to confirm well-formed abscess at this time. 2. Indeterminate lesion in the posterior right hepatic lobe. Hepatic abscess is   in the differential diagnosis. Dedicated multiphasic contrast enhanced CT or MRI   of the liver could better characterize. 3. Please see above for additional comments and chronic findings. Narrative:  Exam: CT ABD PELV W CONT       TECHNIQUE: Multiple transaxial CT images of the abdomen and pelvis were obtained   following the uneventful administration of 100 mL Isovue 370 intravenous   contrast. Coronal and sagittal reformatted images were provided. Dose reduction: All CT scans at this facility are performed using dose reduction   optimization techniques as appropriate to a performed exam including the   following: Automated exposure control, adjustments of the mA and/or kV according   to patient size, or use of iterative reconstruction technique. COMPARISON: None       HISTORY: Large abscess, extends from mons pubis inferiorly and to lower labia       FINDINGS:       Lower thorax:  Favored linear pleural-based scarring/subsegmental atelectasis in   the lung bases. Visualized portions of the heart appear enlarged. Abdomen and pelvis:   Liver: There is a nonspecific 5.1 x 4.4 x 3.7 cm irregular hypodense lesion   within the posterior right hepatic lobe. Gallbladder: Surgically absent. Biliary system: The common duct is mildly enlarged measuring up to 7 mm,   nonspecific, but could be related to reservoir effect from prior   cholecystectomy. Pancreas: Unremarkable. Spleen: Unremarkable. Adrenal glands: Normal.   Kidneys and ureters: Water density cyst measuring up to 3.5 cm within the left   kidney. Other subcentimeter well-circumscribed hypodensities that are too small   to further characterize. The kidneys otherwise enhance symmetrically. No   hydronephrosis or hydroureter is evident. Urinary bladder: Partially distended but otherwise unremarkable. Reproductive organs: Unremarkable. Bowel: No findings of mechanical bowel obstruction. No findings of appendicitis. Peritoneum: No pneumoperitoneum. No free fluid. Lymph nodes: No abdominal/pelvic lymphadenopathy. Aorta and other vessels: The aorta is within normal limits. The systemic and   portal venous systems are opacified. Proximal splanchnic vessels appear patent. Bones: Degenerative changes within the spine, hips, and pelvis. No acute or   aggressive osseous abnormalities are evident. Superficial soft tissues: Centered on the left mons pubis there is an area of   subcutaneous edema and fluid without definite rim enhancement measuring up to   approximately 4.0 x 4.1 x 9.4 cm. There is surrounding inflammatory stranding   within the subcutaneous fat. Mildly prominent left inguinal lymph nodes are most   likely reactive. CXR Results  (Last 48 hours)    None            Assessment and Plan :     Abscess left groin near the labia: Labs 7 x 5 centimeter fluctuant lesion with surrounding erythema on the left groin.   No evidence of Ruchi's gangrene. Started on IV antibiotics, surgery consultation was placed will follow with recommendations    Diabetes mellitus type 2 uncontrolled: We will keep her on Accu-Cheks with a sliding scale insulin. And we talked about importance of diabetes control, recommended to see if diabetic specialist.    Anemia: Looks like microcytic, most likely from chronic menstrual loss. No further interventions needed, will benefit iron and vitamin supplements    Morbid obesity: Patient is explained about importance of weight loss. Diet discussed. She is already admitted to medical floor, full CODE STATUS, home medications none at this time. Has no advance medical directives. No DVT prophylaxis with anticoagulation needed as she is actively ambulatory. CC : Leo Campos DO  Signed By: Jeny Oquendo MD     December 18, 2021      This dictation was done by dragon, computer voice recognition software. Often unanticipated grammatical, syntax, [place] phones and other interpretive errors are inadvertently transcribed. Please excuse errors that have escaped final proofreading.

## 2021-12-18 NOTE — PROGRESS NOTES
Spiritual Care Assessment/Progress Note  [facility]      NAME: Jean Miller      MRN: [id number]  AGE: 51 y.o. SEX: female  Religion Affiliation: No preference   Language: English     12/18/2021     Total Time (in minutes): 35     Spiritual Assessment begun in [address] through conversation with:         [x]Patient        [] Family    [] Friend(s)        Reason for Consult: Initial/Spiritual assessment, patient floor,Rapid response team     Spiritual beliefs: (Please include comment if needed)     [x] Identifies with a arjun tradition:         [] Supported by a arjun community:            [] Claims no spiritual orientation:           [] Seeking spiritual identity:                [] Adheres to an individual form of spirituality:           [] Not able to assess:                           Identified resources for coping:      [x] Prayer                               [] Music                  [] Guided Imagery     [x] Family/friends                 [] Pet visits     [] Devotional reading                         [] Unknown     [] Other:                                              Interventions offered during this visit: (See comments for more details)    Patient Interventions: Affirmation of emotions/emotional suffering,Affirmation of arjun,Catharsis/review of pertinent events in supportive environment,Coping skills reviewed/reinforced,Normalization of emotional/spiritual concerns,Prayer (assurance of)     Family/Friend(s):  Affirmation of emotions/emotional suffering,Catharsis/review of pertinent events in supportive environment,Coping skills reviewed/reinforced,Decision support (comment)     Plan of Care:     [] Support spiritual and/or cultural needs    [] Support AMD and/or advance care planning process      [] Support grieving process   [] Coordinate Rites and/or Rituals    [] Coordination with community clergy   [] No spiritual needs identified at this time   [] Detailed Plan of Care below (See Comments)  [] Make referral to Music Therapy  [] Make referral to Pet Therapy     [] Make referral to Addiction services  [] Make referral to [facility]  [] Make referral to Spiritual Care Partner  [] No future visits requested        [x] Contact Spiritual Care for further referrals     Comments: The purpose of the visit was in response to a Rapid Response and to do a spiritual assessment on the patient. The patient was being attended to by the med-team, however her son struggled deeply the code. He expressed anger and frustration with the level of care. He became tearful and mentioned his disregard about his mother's treatment. The patient shared that it bothered her how emotional how son reacted, and she hoped he would be more composed. The patient's son was being comforted by  and security while his mother was receiving care. The  provided spiritual and emotional support, provided guidance regarding next steps, and facilitated emotional outlet space for the patient and family. [address] [facility] Emmy Gonzalez.    can be reached by calling the  at [facility]  [phone]

## 2021-12-18 NOTE — ROUTINE PROCESS
Bedside and Verbal shift change report given to Scott Chapa RN (oncoming nurse) by Joaquina Schrader (offgoing nurse). Report included the following information SBAR, Kardex, MAR, Accordion, Recent Results, Med Rec Status and Quality Measures.

## 2021-12-19 ENCOUNTER — ANESTHESIA EVENT (OUTPATIENT)
Dept: SURGERY | Age: 46
DRG: 720 | End: 2021-12-19
Payer: COMMERCIAL

## 2021-12-19 ENCOUNTER — ANESTHESIA (OUTPATIENT)
Dept: SURGERY | Age: 46
DRG: 720 | End: 2021-12-19
Payer: COMMERCIAL

## 2021-12-19 ENCOUNTER — APPOINTMENT (OUTPATIENT)
Dept: MRI IMAGING | Age: 46
DRG: 720 | End: 2021-12-19
Attending: INTERNAL MEDICINE
Payer: COMMERCIAL

## 2021-12-19 LAB
ANION GAP SERPL CALC-SCNC: 8 MMOL/L (ref 5–15)
ATRIAL RATE: 107 BPM
BACTERIA SPEC CULT: NORMAL
BASOPHILS # BLD: 0.1 K/UL (ref 0–0.1)
BASOPHILS NFR BLD: 0 % (ref 0–1)
BUN SERPL-MCNC: 6 MG/DL (ref 6–20)
BUN/CREAT SERPL: 9 (ref 12–20)
CA-I BLD-MCNC: 8.5 MG/DL (ref 8.5–10.1)
CALCULATED P AXIS, ECG09: 129 DEGREES
CALCULATED R AXIS, ECG10: 131 DEGREES
CALCULATED T AXIS, ECG11: -25 DEGREES
CHLORIDE SERPL-SCNC: 106 MMOL/L (ref 97–108)
CO2 SERPL-SCNC: 26 MMOL/L (ref 21–32)
CREAT SERPL-MCNC: 0.66 MG/DL (ref 0.55–1.02)
DIAGNOSIS, 93000: NORMAL
DIFFERENTIAL METHOD BLD: ABNORMAL
EOSINOPHIL # BLD: 0.1 K/UL (ref 0–0.4)
EOSINOPHIL NFR BLD: 1 % (ref 0–7)
ERYTHROCYTE [DISTWIDTH] IN BLOOD BY AUTOMATED COUNT: 12.4 % (ref 11.5–14.5)
GLUCOSE BLD STRIP.AUTO-MCNC: 248 MG/DL (ref 65–117)
GLUCOSE BLD STRIP.AUTO-MCNC: 263 MG/DL (ref 65–117)
GLUCOSE BLD STRIP.AUTO-MCNC: 275 MG/DL (ref 65–117)
GLUCOSE SERPL-MCNC: 270 MG/DL (ref 65–100)
GRAM STN SPEC: NORMAL
HCT VFR BLD AUTO: 29.3 % (ref 35–47)
HGB BLD-MCNC: 9.7 G/DL (ref 11.5–16)
IMM GRANULOCYTES # BLD AUTO: 0.3 K/UL (ref 0–0.04)
IMM GRANULOCYTES NFR BLD AUTO: 2 % (ref 0–0.5)
LYMPHOCYTES # BLD: 1.7 K/UL (ref 0.8–3.5)
LYMPHOCYTES NFR BLD: 11 % (ref 12–49)
MCH RBC QN AUTO: 29.4 PG (ref 26–34)
MCHC RBC AUTO-ENTMCNC: 33.1 G/DL (ref 30–36.5)
MCV RBC AUTO: 88.8 FL (ref 80–99)
MONOCYTES # BLD: 1.3 K/UL (ref 0–1)
MONOCYTES NFR BLD: 9 % (ref 5–13)
NEUTS SEG # BLD: 11.2 K/UL (ref 1.8–8)
NEUTS SEG NFR BLD: 77 % (ref 32–75)
NRBC # BLD: 0 K/UL (ref 0–0.01)
NRBC BLD-RTO: 0 PER 100 WBC
P-R INTERVAL, ECG05: 168 MS
PERFORMED BY, TECHID: ABNORMAL
PLATELET # BLD AUTO: 190 K/UL (ref 150–400)
PMV BLD AUTO: 11.6 FL (ref 8.9–12.9)
POTASSIUM SERPL-SCNC: 3.6 MMOL/L (ref 3.5–5.1)
Q-T INTERVAL, ECG07: 324 MS
QRS DURATION, ECG06: 88 MS
QTC CALCULATION (BEZET), ECG08: 432 MS
RBC # BLD AUTO: 3.3 M/UL (ref 3.8–5.2)
SODIUM SERPL-SCNC: 140 MMOL/L (ref 136–145)
SPECIAL REQUESTS,SREQ: NORMAL
VENTRICULAR RATE, ECG03: 107 BPM
WBC # BLD AUTO: 14.5 K/UL (ref 3.6–11)

## 2021-12-19 PROCEDURE — 74011250636 HC RX REV CODE- 250/636: Performed by: INTERNAL MEDICINE

## 2021-12-19 PROCEDURE — 10060 I&D ABSCESS SIMPLE/SINGLE: CPT | Performed by: COLON & RECTAL SURGERY

## 2021-12-19 PROCEDURE — 36415 COLL VENOUS BLD VENIPUNCTURE: CPT

## 2021-12-19 PROCEDURE — 0H9AXZZ DRAINAGE OF INGUINAL SKIN, EXTERNAL APPROACH: ICD-10-PCS | Performed by: COLON & RECTAL SURGERY

## 2021-12-19 PROCEDURE — 74011636637 HC RX REV CODE- 636/637: Performed by: HOSPITALIST

## 2021-12-19 PROCEDURE — 74011000258 HC RX REV CODE- 258: Performed by: INTERNAL MEDICINE

## 2021-12-19 PROCEDURE — 74011250637 HC RX REV CODE- 250/637: Performed by: HOSPITALIST

## 2021-12-19 PROCEDURE — 74011250636 HC RX REV CODE- 250/636: Performed by: HOSPITALIST

## 2021-12-19 PROCEDURE — 85025 COMPLETE CBC W/AUTO DIFF WBC: CPT

## 2021-12-19 PROCEDURE — 99232 SBSQ HOSP IP/OBS MODERATE 35: CPT | Performed by: COLON & RECTAL SURGERY

## 2021-12-19 PROCEDURE — 74011000250 HC RX REV CODE- 250: Performed by: COLON & RECTAL SURGERY

## 2021-12-19 PROCEDURE — 74011250636 HC RX REV CODE- 250/636: Performed by: COLON & RECTAL SURGERY

## 2021-12-19 PROCEDURE — 80048 BASIC METABOLIC PNL TOTAL CA: CPT

## 2021-12-19 PROCEDURE — 74011250637 HC RX REV CODE- 250/637: Performed by: INTERNAL MEDICINE

## 2021-12-19 PROCEDURE — 65270000029 HC RM PRIVATE

## 2021-12-19 PROCEDURE — 82962 GLUCOSE BLOOD TEST: CPT

## 2021-12-19 RX ORDER — MIDAZOLAM HYDROCHLORIDE 1 MG/ML
1 INJECTION, SOLUTION INTRAMUSCULAR; INTRAVENOUS AS NEEDED
Status: CANCELLED | OUTPATIENT
Start: 2021-12-20

## 2021-12-19 RX ORDER — LIDOCAINE HYDROCHLORIDE 10 MG/ML
0.1 INJECTION, SOLUTION EPIDURAL; INFILTRATION; INTRACAUDAL; PERINEURAL AS NEEDED
Status: CANCELLED | OUTPATIENT
Start: 2021-12-20

## 2021-12-19 RX ORDER — DIPHENHYDRAMINE HYDROCHLORIDE 50 MG/ML
12.5 INJECTION, SOLUTION INTRAMUSCULAR; INTRAVENOUS AS NEEDED
Status: CANCELLED | OUTPATIENT
Start: 2021-12-20 | End: 2021-12-20

## 2021-12-19 RX ORDER — HYDROMORPHONE HYDROCHLORIDE 1 MG/ML
0.4 INJECTION, SOLUTION INTRAMUSCULAR; INTRAVENOUS; SUBCUTANEOUS
Status: CANCELLED | OUTPATIENT
Start: 2021-12-20

## 2021-12-19 RX ORDER — MIDAZOLAM HYDROCHLORIDE 1 MG/ML
0.5 INJECTION, SOLUTION INTRAMUSCULAR; INTRAVENOUS ONCE
Status: DISPENSED | OUTPATIENT
Start: 2021-12-19 | End: 2021-12-20

## 2021-12-19 RX ORDER — SODIUM CHLORIDE 0.9 % (FLUSH) 0.9 %
5-40 SYRINGE (ML) INJECTION AS NEEDED
Status: CANCELLED | OUTPATIENT
Start: 2021-12-20

## 2021-12-19 RX ORDER — FENTANYL CITRATE 50 UG/ML
50 INJECTION, SOLUTION INTRAMUSCULAR; INTRAVENOUS
Status: CANCELLED | OUTPATIENT
Start: 2021-12-20

## 2021-12-19 RX ORDER — FENTANYL CITRATE 50 UG/ML
50 INJECTION, SOLUTION INTRAMUSCULAR; INTRAVENOUS AS NEEDED
Status: CANCELLED | OUTPATIENT
Start: 2021-12-20

## 2021-12-19 RX ORDER — MIDAZOLAM HYDROCHLORIDE 1 MG/ML
0.5 INJECTION, SOLUTION INTRAMUSCULAR; INTRAVENOUS
Status: CANCELLED | OUTPATIENT
Start: 2021-12-20

## 2021-12-19 RX ORDER — SODIUM CHLORIDE 0.9 % (FLUSH) 0.9 %
5-40 SYRINGE (ML) INJECTION EVERY 8 HOURS
Status: CANCELLED | OUTPATIENT
Start: 2021-12-20

## 2021-12-19 RX ORDER — HYDROMORPHONE HYDROCHLORIDE 1 MG/ML
0.5 INJECTION, SOLUTION INTRAMUSCULAR; INTRAVENOUS; SUBCUTANEOUS ONCE
Status: COMPLETED | OUTPATIENT
Start: 2021-12-19 | End: 2021-12-19

## 2021-12-19 RX ORDER — LIDOCAINE HYDROCHLORIDE 10 MG/ML
10 INJECTION, SOLUTION EPIDURAL; INFILTRATION; INTRACAUDAL; PERINEURAL ONCE
Status: COMPLETED | OUTPATIENT
Start: 2021-12-19 | End: 2021-12-19

## 2021-12-19 RX ORDER — HYDROCODONE BITARTRATE AND ACETAMINOPHEN 5; 325 MG/1; MG/1
1 TABLET ORAL AS NEEDED
Status: CANCELLED | OUTPATIENT
Start: 2021-12-20

## 2021-12-19 RX ORDER — EPHEDRINE SULFATE/0.9% NACL/PF 50 MG/5 ML
5 SYRINGE (ML) INTRAVENOUS AS NEEDED
Status: CANCELLED | OUTPATIENT
Start: 2021-12-20

## 2021-12-19 RX ORDER — METOPROLOL TARTRATE 25 MG/1
25 TABLET, FILM COATED ORAL EVERY 12 HOURS
Status: DISCONTINUED | OUTPATIENT
Start: 2021-12-19 | End: 2021-12-20 | Stop reason: HOSPADM

## 2021-12-19 RX ORDER — ONDANSETRON 2 MG/ML
4 INJECTION INTRAMUSCULAR; INTRAVENOUS AS NEEDED
Status: CANCELLED | OUTPATIENT
Start: 2021-12-20

## 2021-12-19 RX ADMIN — AMPICILLIN SODIUM AND SULBACTAM SODIUM 1.5 G: 1; .5 INJECTION, POWDER, FOR SOLUTION INTRAMUSCULAR; INTRAVENOUS at 13:20

## 2021-12-19 RX ADMIN — INSULIN LISPRO 7 UNITS: 100 INJECTION, SOLUTION INTRAVENOUS; SUBCUTANEOUS at 16:30

## 2021-12-19 RX ADMIN — AMPICILLIN SODIUM AND SULBACTAM SODIUM 1.5 G: 1; .5 INJECTION, POWDER, FOR SOLUTION INTRAMUSCULAR; INTRAVENOUS at 07:27

## 2021-12-19 RX ADMIN — Medication 10 ML: at 16:24

## 2021-12-19 RX ADMIN — VANCOMYCIN HYDROCHLORIDE 1000 MG: 1 INJECTION, POWDER, LYOPHILIZED, FOR SOLUTION INTRAVENOUS at 16:09

## 2021-12-19 RX ADMIN — VANCOMYCIN HYDROCHLORIDE 1000 MG: 1 INJECTION, POWDER, LYOPHILIZED, FOR SOLUTION INTRAVENOUS at 02:00

## 2021-12-19 RX ADMIN — OXYCODONE 5 MG: 5 TABLET ORAL at 07:55

## 2021-12-19 RX ADMIN — Medication 10 ML: at 07:27

## 2021-12-19 RX ADMIN — LIDOCAINE HYDROCHLORIDE 1 ML: 10 INJECTION, SOLUTION EPIDURAL; INFILTRATION; INTRACAUDAL; PERINEURAL at 17:00

## 2021-12-19 RX ADMIN — INSULIN LISPRO 4 UNITS: 100 INJECTION, SOLUTION INTRAVENOUS; SUBCUTANEOUS at 22:00

## 2021-12-19 RX ADMIN — OXYCODONE 5 MG: 5 TABLET ORAL at 13:11

## 2021-12-19 RX ADMIN — HYDROMORPHONE HYDROCHLORIDE 0.5 MG: 1 INJECTION, SOLUTION INTRAMUSCULAR; INTRAVENOUS; SUBCUTANEOUS at 16:40

## 2021-12-19 RX ADMIN — METOPROLOL TARTRATE 25 MG: 25 TABLET, FILM COATED ORAL at 22:06

## 2021-12-19 RX ADMIN — AMPICILLIN SODIUM AND SULBACTAM SODIUM 1.5 G: 1; .5 INJECTION, POWDER, FOR SOLUTION INTRAMUSCULAR; INTRAVENOUS at 00:41

## 2021-12-19 RX ADMIN — AMPICILLIN SODIUM AND SULBACTAM SODIUM 1.5 G: 1; .5 INJECTION, POWDER, FOR SOLUTION INTRAMUSCULAR; INTRAVENOUS at 22:06

## 2021-12-19 RX ADMIN — OXYCODONE 5 MG: 5 TABLET ORAL at 22:06

## 2021-12-19 RX ADMIN — Medication 10 ML: at 22:07

## 2021-12-19 RX ADMIN — METOPROLOL TARTRATE 25 MG: 25 TABLET, FILM COATED ORAL at 13:12

## 2021-12-19 NOTE — PROGRESS NOTES
Progress Note    Patient: Leonela Roca MRN: [id number]  SSN: [id number]    YOB: 1975  Age: 51 y.o. Sex: female      Admit Date: 12/17/2021    LOS: 1 day     Subjective:   Hospital day 2 for labial suprapubic abscess  Patient seen in bed  Continues to complain of pain at the left side suprapubic and labial      Objective:     Vitals:    12/18/21 1200 12/18/21 1554 12/18/21 2100 12/19/21 0755   BP:   (!) 102/59 122/65   Pulse: (!) 109 (!) 103 (!) 111 (!) 111   Resp:   18 20   Temp:   99.1 °F (37.3 °C) 98.8 °F (37.1 °C)   SpO2:   100% 100%   Weight:       Height:            Intake and Output:  Current Shift: No intake/output data recorded. Last three shifts: No intake/output data recorded. Review of Systems:  ROS     Physical Exam:   Left labial and suprapubic abscess draining somewhat spontaneously however  to palpation and fluctuant    Lab/Data Review:           Assessment:     Active Problems:    Abscess (12/17/2021)      Cellulitis (12/17/2021)      Sepsis ([address] (12/17/2021)      Abscess of female pelvis (12/18/2021)        Plan:   Patient is draining spontaneously however remains very tender with fluctuance present. Leukocytosis has come down however I told the patient I feel she would benefit from a surgical drainage. I recommend incision and drainage of her abscess reviewed the risk and benefits of and the risk of infection, bleeding, wound complications, recurrent abscess. She wishes to proceed with surgery and consent is obtained and her surgery is scheduled for today.       Signed By: Isai Wallace MD     December 19, 2021

## 2021-12-19 NOTE — PROGRESS NOTES
11:00 Off unit for MRI. IVf placed on hold. Patient alert and unlabored. 13:00 Patient sitting up at edge of bed.  at bedside. Consent for procedure reviewed with patient. Consent signed. States was unable to complete testing couldn't breathe well and too much pain. 13:11 Oxycodone  1 tablet given for pain to labia. IV site clugged. unable to flush. Same removed. PICC team called. Awaiting restart. 14:00 awating IV restart.

## 2021-12-19 NOTE — PROGRESS NOTES
Hospitalist Progress Note               Daily Progress Note: 12/19/2021      Subjective:   Hospital course to date: Patient is a 62-year-old female with a history of diabetes mellitus, not on any medication as well as hypertension who presented to the ED on the evening of 12/17 complaining of an abscess in the left-sided vaginal area for the past week. She had been seen at an urgent care center recently and was given Augmentin and Bactrim which she took without improvement. CT showed a large abscess centered on the left mons pubis measuring 4.0 x 4.1 x 9.4 cm. She was admitted, started on IV antibiotics with plans for surgical drainage    She was also found to have a nonspecific hypodense lesion in the posterior right hepatic lobe measuring 5.1 x 4.4 x 3.7 cm, with differential including hepatic abscess. -------  Patient is seen for follow-up. She had an episode of chest pain yesterday after her Zosyn was started.   She had a brief episode of tachycardia, possibly SVT    She could not lie still for her MRI due to pain in the vaginal area from her abscess and this will be rescheduled for tomorrow    I switched her to Unasyn    Problem List:  Problem List as of 12/19/2021 Date Reviewed: 12/18/2021          Codes Class Noted - Resolved    Abscess of female pelvis ICD-10-CM: N73.9  ICD-9-CM: 614.4  12/18/2021 - Present        Abscess ICD-10-CM: L02.91  ICD-9-CM: 682.9  12/17/2021 - Present        Cellulitis ICD-10-CM: L03.90  ICD-9-CM: 682.9  12/17/2021 - Present        Sepsis ([facility] ICD-10-CM: A41.9  ICD-9-CM: 038.9, 995.91  12/17/2021 - Present        Obesity, morbid ([facility] ICD-10-CM: E66.01  ICD-9-CM: 278.01  5/2/2018 - Present        Type 2 diabetes mellitus without complication ([facility] QVG-83-WP: E11.9  ICD-9-CM: 250.00  3/9/2016 - Present        Essential hypertension ICD-10-CM: I10  ICD-9-CM: 401.9  3/9/2016 - Present              Medications reviewed  Current Facility-Administered Medications Medication Dose Route Frequency    metoprolol tartrate (LOPRESSOR) tablet 25 mg  25 mg Oral Q12H    gadobenate dimeglumine (MULTIHANCE) 529 mg/mL (0.1mmol/0.2mL) contrast injection 20 mL  20 mL IntraVENous RAD ONCE    sodium chloride (NS) flush 5-40 mL  5-40 mL IntraVENous Q8H    sodium chloride (NS) flush 5-40 mL  5-40 mL IntraVENous PRN    acetaminophen (TYLENOL) solution 650 mg  650 mg Oral Q4H PRN    oxyCODONE IR (ROXICODONE) tablet 5 mg  5 mg Oral Q4H PRN    insulin lispro (HUMALOG) injection   SubCUTAneous AC&HS    glucose chewable tablet 16 g  4 Tablet Oral PRN    dextrose (D50W) injection syrg 12.5-25 g  25-50 mL IntraVENous PRN    glucagon (GLUCAGEN) injection 1 mg  1 mg IntraMUSCular PRN    0.9% sodium chloride infusion  100 mL/hr IntraVENous CONTINUOUS    vancomycin (VANCOCIN) 1,000 mg in 0.9% sodium chloride 250 mL (VIAL-MATE)  1,000 mg IntraVENous Q12H    [START ON 2021] Vancomycin - Trough to be drawn prior to dose  @ 1300   Other ONCE    ampicillin-sulbactam (UNASYN) 1.5 g in 0.9% sodium chloride (MBP/ADV) 50 mL MBP  1.5 g IntraVENous Q6H    Vancomycin dose per pharmacy policy   Other Rx Dosing/Monitoring       Review of Systems:   A comprehensive review of systems was negative except for that written in the HPI. Objective:   Physical Exam:     Visit Vitals  /65   Pulse (!) 111   Temp 98.8 °F (37.1 °C)   Resp 20   Ht 5' 7\" (1.702 m)   Wt 127 kg (280 lb)   LMP 2021 (Exact Date)   SpO2 100%   BMI 43.85 kg/m²      O2 Device: None (Room air)    Temp (24hrs), Av °F (37.2 °C), Min:98.8 °F (37.1 °C), Max:99.1 °F (37.3 °C)    No intake/output data recorded. No intake/output data recorded. General:   Awake and alert   Lungs:   Clear to auscultation bilaterally. Chest wall:  No tenderness or deformity. Heart:  Regular rate and rhythm, S1, S2 normal, no murmur, click, rub or gallop. Abdomen:   Soft, non-tender.  Bowel sounds normal. No masses,  No organomegaly. Extremities: Extremities normal, atraumatic, no cyanosis or edema. Pulses: 2+ and symmetric all extremities. Skin: Skin color, texture, turgor normal. No rashes or lesions   Neurologic: CNII-XII intact. No gross focal deficits         Data Review:       Recent Days:  Recent Labs     12/19/21  0659 12/18/21  0625 12/17/21  1517   WBC 14.5* 18.3* 17.5*   HGB 9.7* 10.1* 10.8*   HCT 29.3* 31.5* 32.0*    169 154     Recent Labs     12/19/21  0659 12/18/21  0625 12/17/21  1517    138 137   K 3.6 3.8 3.9    106 99   CO2 26 23 26   * 299* 311*   BUN 6 6 8   CREA 0.66 0.82 1.00   CA 8.5 8.7 8.8   ALB  --  2.3*  --    TBILI  --  0.6  --    ALT  --  12  --      No results for input(s): PH, PCO2, PO2, HCO3, FIO2 in the last 72 hours.     24 Hour Results:  Recent Results (from the past 24 hour(s))   TROPONIN-HIGH SENSITIVITY    Collection Time: 12/18/21  3:26 PM   Result Value Ref Range    Troponin-High Sensitivity 8 0 - 51 ng/L   GLUCOSE, POC    Collection Time: 12/18/21  5:17 PM   Result Value Ref Range    Glucose (POC) 259 (H) 65 - 117 mg/dL    Performed by [address] [place] [address],[address], POC    Collection Time: 12/18/21  8:05 PM   Result Value Ref Range    Glucose (POC) 270 (H) 65 - 117 mg/dL    [facility]    METABOLIC PANEL, BASIC    Collection Time: 12/19/21  6:59 AM   Result Value Ref Range    Sodium 140 136 - 145 mmol/L    Potassium 3.6 3.5 - 5.1 mmol/L    Chloride 106 97 - 108 mmol/L    CO2 26 21 - 32 mmol/L    Anion gap 8 5 - 15 mmol/L    Glucose 270 (H) 65 - 100 mg/dL    BUN 6 6 - 20 mg/dL    Creatinine 0.66 0.55 - 1.02 mg/dL    BUN/Creatinine ratio 9 (L) 12 - 20      GFR est AA >60 >60 ml/min/1.73m2    GFR est non-AA >60 >60 ml/min/1.73m2    Calcium 8.5 8.5 - 10.1 mg/dL   CBC WITH AUTOMATED DIFF    Collection Time: 12/19/21  6:59 AM   Result Value Ref Range    WBC 14.5 (H) 3.6 - 11.0 K/uL    RBC 3.30 (L) 3.80 - 5.20 M/uL    HGB 9.7 (L) 11.5 - 16.0 g/dL    HCT 29.3 (L) 35.0 - 47.0 %    MCV 88.8 80.0 - 99.0 FL    MCH 29.4 26.0 - 34.0 PG    MCHC 33.1 30.0 - 36.5 g/dL    RDW 12.4 11.5 - 14.5 %    PLATELET 418 460 - 109 K/uL    MPV 11.6 8.9 - 12.9 FL    NRBC 0.0 0.0  WBC    ABSOLUTE NRBC 0.00 0.00 - 0.01 K/uL    NEUTROPHILS 77 (H) 32 - 75 %    LYMPHOCYTES 11 (L) 12 - 49 %    MONOCYTES 9 5 - 13 %    EOSINOPHILS 1 0 - 7 %    BASOPHILS 0 0 - 1 %    IMMATURE GRANULOCYTES 2 (H) 0 - 0.5 %    ABS. NEUTROPHILS 11.2 (H) 1.8 - 8.0 K/UL    ABS. LYMPHOCYTES 1.7 0.8 - 3.5 K/UL    ABS. MONOCYTES 1.3 (H) 0.0 - 1.0 K/UL    ABS. EOSINOPHILS 0.1 0.0 - 0.4 K/UL    ABS. BASOPHILS 0.1 0.0 - 0.1 K/UL    ABS. IMM. GRANS. 0.3 (H) 0.00 - 0.04 K/UL    DF AUTOMATED     GLUCOSE, POC    Collection Time: 12/19/21  7:48 AM   Result Value Ref Range    Glucose (POC) 263 (H) 65 - 117 mg/dL    Performed by Ruben Saunders        MRI ABD W WO CONT         XR CHEST PORT   Final Result   No acute cardiopulmonary findings. CT ABD PELV W CONT   Final Result   1. Subcutaneous edema and fluid centered on the left mons pubis with surrounding   inflammatory stranding. This most likely represents phlegmon/organizing abscess. No definite rim enhancement to confirm well-formed abscess at this time. 2. Indeterminate lesion in the posterior right hepatic lobe. Hepatic abscess is   in the differential diagnosis. Dedicated multiphasic contrast enhanced CT or MRI   of the liver could better characterize. 3. Please see above for additional comments and chronic findings.               Assessment:  Large left vaginal/pelvic abscess    Hypodense lesion right hepatic lobe, rule out abscess although clinically not highly suspicious    Sepsis with leukocytosis and tachycardia, present on admission    Chest pain and PSVT    Diabetes mellitus, uncontrolled    Morbid obesity    Obstructive sleep apnea      Plan:  Continue vancomycin Unasyn  Patient going to the OR for I&D this afternoon  Obtain MRI of liver tomorrow      Care Plan discussed with: Patient/Family    Disposition: Continued inpatient stay    Total time spent with patient: 30 minutes.     Latha Goncalves MD

## 2021-12-19 NOTE — ANESTHESIA PREPROCEDURE EVALUATION
Relevant Problems   CARDIOVASCULAR   (+) Essential hypertension      ENDOCRINE   (+) Obesity, morbid (HCC)   (+) Type 2 diabetes mellitus without complication (HCC)       Anesthetic History   No history of anesthetic complications            Review of Systems / Medical History  Patient summary reviewed, nursing notes reviewed and pertinent labs reviewed    Pulmonary        Sleep apnea           Neuro/Psych         Psychiatric history (Anxiety / depression. )     Cardiovascular    Hypertension                Comments: \"HEART RACING FASTER\". EKG (12-18-21)   Suspect arm lead reversal, interpretation assumes no reversal   Unusual P axis, possible ectopic atrial tachycardia   Right axis deviation   Low voltage QRS   Possible Inferior infarct , age undetermined   Cannot rule out Anterior infarct , age undetermined   Abnormal ECG   No previous ECGs available   GI/Hepatic/Renal                Endo/Other    Diabetes    Morbid obesity and anemia (Hb/Hct 9.7/29. 3)    Comments: Sepsis   FEVER OFF AND ON. Other Findings   Comments: Sciatica left side. Physical Exam    Airway  Mallampati: IV  TM Distance: 4 - 6 cm  Neck ROM: normal range of motion   Mouth opening: Normal     Cardiovascular      Rate: normal      Pertinent negatives: No murmur   Dental    Dentition: Upper dentition intact and Lower dentition intact     Pulmonary  Breath sounds clear to auscultation               Abdominal  GI exam deferred       Other Findings            Anesthetic Plan    ASA: 3, emergent  Anesthesia type: MAC          Induction: Intravenous  Anesthetic plan and risks discussed with: Patient      Surgeon requested MAC.

## 2021-12-19 NOTE — PROGRESS NOTES
Progress Note      12/19/2021 8:40 AM  NAME: Renee Rose   MRN:  [id number]   Admit Diagnosis: Abscess [L02.91]  Cellulitis [L03.90]  Sepsis ([address] [A41.9]  Abscess of female pelvis [N73.9]      Problem List:   1. Medication noncompliance prior to admission  2. Vaginal abscess/cellulitis  3. Sepsis  4. Chest pain  5. Paroxysmal supraventricular tachycardia  6. Hypertension  7. Dyslipidemia  8. Type 2 diabetes  9. Family history of premature coronary disease (mother with CAD at age 48)  8. Obstructive sleep apnea requiring CPAP     11. Severe obesity (BMI =43.85 kg/m² )  12. .  Anxiety  13. Depression   14. Leukocytosis  15. Anemia       Subjective: The patient is seen and examined in room 410. There were no acute cardiovascular events reported overnight. Currently, she denies any cardiovascular complaints. Her telemetry monitor shows resting sinus tachycardia. Discussed with RN events overnight.      Medications Personally Reviewed:    Current Facility-Administered Medications   Medication Dose Route Frequency    sodium chloride (NS) flush 5-40 mL  5-40 mL IntraVENous Q8H    sodium chloride (NS) flush 5-40 mL  5-40 mL IntraVENous PRN    acetaminophen (TYLENOL) solution 650 mg  650 mg Oral Q4H PRN    oxyCODONE IR (ROXICODONE) tablet 5 mg  5 mg Oral Q4H PRN    insulin lispro (HUMALOG) injection   SubCUTAneous AC&HS    glucose chewable tablet 16 g  4 Tablet Oral PRN    dextrose (D50W) injection syrg 12.5-25 g  25-50 mL IntraVENous PRN    glucagon (GLUCAGEN) injection 1 mg  1 mg IntraMUSCular PRN    0.9% sodium chloride infusion  100 mL/hr IntraVENous CONTINUOUS    vancomycin (VANCOCIN) 1,000 mg in 0.9% sodium chloride 250 mL (VIAL-MATE)  1,000 mg IntraVENous Q12H    [START ON 12/20/2021] Vancomycin - Trough to be drawn prior to dose 12/20 @ 1300   Other ONCE    ampicillin-sulbactam (UNASYN) 1.5 g in 0.9% sodium chloride (MBP/ADV) 50 mL MBP  1.5 g IntraVENous Q6H    Vancomycin dose per pharmacy policy   Other Rx Dosing/Monitoring           Objective:      Physical Exam:  Essentially unchanged  Last 24hrs VS reviewed since prior progress note. Most recent are:    Visit Vitals  /65   Pulse (!) 111   Temp 98.8 °F (37.1 °C)   Resp 20   Ht 5' 7\" (1.702 m)   Wt 127 kg (280 lb)   LMP 12/01/2021 (Exact Date)   SpO2 100%   BMI 43.85 kg/m²     No intake or output data in the 24 hours ending 12/19/21 0840     Data Review    Telemetry: Sinus rhythm with resting sinus tachycardia. EKG:   []  No new EKG for review    Lab Data Personally Reviewed:    Recent Labs     12/19/21  0659 12/18/21  0625   WBC 14.5* 18.3*   HGB 9.7* 10.1*   HCT 29.3* 31.5*    169     No results for input(s): INR, PTP, APTT, INREXT in the last 72 hours. Recent Labs     12/19/21  0659 12/18/21  0625 12/17/21  1517    138 137   K 3.6 3.8 3.9    106 99   CO2 26 23 26   BUN 6 6 8   CREA 0.66 0.82 1.00   * 299* 311*   CA 8.5 8.7 8.8     No results for input(s): CPK, CKNDX, TROIQ in the last 72 hours. No lab exists for component: CPKMB  Lab Results   Component Value Date/Time    Cholesterol, total 168 06/20/2019 10:06 AM    HDL Cholesterol 41 06/20/2019 10:06 AM    LDL, calculated 113 (H) 06/20/2019 10:06 AM    Triglyceride 69 06/20/2019 10:06 AM       Recent Labs     12/18/21  0625      TP 7.2   ALB 2.3*   GLOB 4.9*     No results for input(s): PH, PCO2, PO2 in the last 72 hours. Assessment/Plan:   1. Continue telemetry monitoring  2. Continue to monitor serum electrolytes, renal function  3. Obtain complete 2D echocardiogram  4. Continue current cardiovascular medications including insulin  5.  Add beta-blocker for rhythm and rate control     Dakota Aguilar MD

## 2021-12-19 NOTE — PROCEDURES
Indication of procedure: Patient 56-year-old female came in with complaints of left labial and pubic pain. She was found to have an abscess. She was admitted and started on IV antibiotics. After discussion with the patient I recommend incision and drainage procedure and she agrees to the procedure. The risk and benefits were explained and the risk of infection, bleeding, wound complications, recurrence. Consent was obtained prior to the procedure. Description of procedure: After a timeout ultrasound the patient's identity and surgical procedure were once again confirmed, 0.5 mg IV Dilaudid was administered and the left labia and pubic area prepped with alcohol. 1% lidocaine was infiltrated at both sites. 11 blade was used to make an incision in the skin of both sites. Purulent material was obtained from both sides with the majority coming from the pubic region. Dressings were applied after ensuring hemostasis. The patient tolerated the procedure well with no complications. Blood loss: Minimal    Complications: Minimal    Recommendations continue IV antibiotics, repeat CBC in a.m.

## 2021-12-19 NOTE — PROGRESS NOTES
Reason for Admission:                       RUR Score:                     Plan for utilizing home health:          PCP: First and Last name:  Abdoulaye Rodríguez DO     Name of Practice:    Are you a current patient: Yes/No:    Approximate date of last visit:    Can you participate in a virtual visit with your PCP:                     Current Advanced Directive/Advance Care Plan: Full Code      Healthcare Decision Maker:   Click here to complete [id number] including selection of the Healthcare Decision Maker Relationship (ie \"Primary\")                             Transition of Care Plan:                      CM met with patient at bedside. Patient is alert and oriented lives in a home with her . Patient sleeps with a cpap. No hx of HH, SNF, or rehab. Patient is independent with all ADL's. CM will continue to follow.

## 2021-12-20 ENCOUNTER — APPOINTMENT (OUTPATIENT)
Dept: ULTRASOUND IMAGING | Age: 46
DRG: 720 | End: 2021-12-20
Attending: INTERNAL MEDICINE
Payer: COMMERCIAL

## 2021-12-20 VITALS
TEMPERATURE: 98.2 F | BODY MASS INDEX: 43.95 KG/M2 | RESPIRATION RATE: 20 BRPM | HEIGHT: 67 IN | HEART RATE: 86 BPM | WEIGHT: 280 LBS | DIASTOLIC BLOOD PRESSURE: 70 MMHG | SYSTOLIC BLOOD PRESSURE: 122 MMHG | OXYGEN SATURATION: 100 %

## 2021-12-20 LAB
BASOPHILS # BLD: 0.1 K/UL (ref 0–0.1)
BASOPHILS NFR BLD: 1 % (ref 0–1)
DATE LAST DOSE: NORMAL
DIFFERENTIAL METHOD BLD: ABNORMAL
EOSINOPHIL # BLD: 0.1 K/UL (ref 0–0.4)
EOSINOPHIL NFR BLD: 1 % (ref 0–7)
ERYTHROCYTE [DISTWIDTH] IN BLOOD BY AUTOMATED COUNT: 12.5 % (ref 11.5–14.5)
GLUCOSE BLD STRIP.AUTO-MCNC: 232 MG/DL (ref 65–117)
GLUCOSE BLD STRIP.AUTO-MCNC: 233 MG/DL (ref 65–117)
HCT VFR BLD AUTO: 28.5 % (ref 35–47)
HGB BLD-MCNC: 9.6 G/DL (ref 11.5–16)
IMM GRANULOCYTES # BLD AUTO: 0.2 K/UL (ref 0–0.04)
IMM GRANULOCYTES NFR BLD AUTO: 2 % (ref 0–0.5)
LYMPHOCYTES # BLD: 1.4 K/UL (ref 0.8–3.5)
LYMPHOCYTES NFR BLD: 13 % (ref 12–49)
MCH RBC QN AUTO: 29.7 PG (ref 26–34)
MCHC RBC AUTO-ENTMCNC: 33.7 G/DL (ref 30–36.5)
MCV RBC AUTO: 88.2 FL (ref 80–99)
MONOCYTES # BLD: 1 K/UL (ref 0–1)
MONOCYTES NFR BLD: 9 % (ref 5–13)
NEUTS SEG # BLD: 8.2 K/UL (ref 1.8–8)
NEUTS SEG NFR BLD: 74 % (ref 32–75)
NRBC # BLD: 0 K/UL (ref 0–0.01)
NRBC BLD-RTO: 0 PER 100 WBC
PERFORMED BY, TECHID: ABNORMAL
PERFORMED BY, TECHID: ABNORMAL
PLATELET # BLD AUTO: 217 K/UL (ref 150–400)
PMV BLD AUTO: 11.2 FL (ref 8.9–12.9)
PROCALCITONIN SERPL-MCNC: 0.05 NG/ML
RBC # BLD AUTO: 3.23 M/UL (ref 3.8–5.2)
REPORTED DOSE,DOSE: NORMAL UNITS
VANCOMYCIN SERPL-MCNC: 9.9 UG/ML
WBC # BLD AUTO: 11 K/UL (ref 3.6–11)

## 2021-12-20 PROCEDURE — 80202 ASSAY OF VANCOMYCIN: CPT

## 2021-12-20 PROCEDURE — 74011636637 HC RX REV CODE- 636/637: Performed by: HOSPITALIST

## 2021-12-20 PROCEDURE — 85025 COMPLETE CBC W/AUTO DIFF WBC: CPT

## 2021-12-20 PROCEDURE — 74011000258 HC RX REV CODE- 258: Performed by: INTERNAL MEDICINE

## 2021-12-20 PROCEDURE — 74011250637 HC RX REV CODE- 250/637: Performed by: INTERNAL MEDICINE

## 2021-12-20 PROCEDURE — 36415 COLL VENOUS BLD VENIPUNCTURE: CPT

## 2021-12-20 PROCEDURE — 74011250636 HC RX REV CODE- 250/636: Performed by: INTERNAL MEDICINE

## 2021-12-20 PROCEDURE — 82962 GLUCOSE BLOOD TEST: CPT

## 2021-12-20 PROCEDURE — 74011250636 HC RX REV CODE- 250/636: Performed by: HOSPITALIST

## 2021-12-20 PROCEDURE — 84145 PROCALCITONIN (PCT): CPT

## 2021-12-20 PROCEDURE — 76705 ECHO EXAM OF ABDOMEN: CPT

## 2021-12-20 RX ORDER — INSULIN GLARGINE 100 [IU]/ML
INJECTION, SOLUTION SUBCUTANEOUS
Qty: 1 ADJUSTABLE DOSE PRE-FILLED PEN SYRINGE | Refills: 0 | Status: SHIPPED | OUTPATIENT
Start: 2021-12-20 | End: 2022-03-03 | Stop reason: SDUPTHER

## 2021-12-20 RX ORDER — OXYCODONE HYDROCHLORIDE 5 MG/1
5 TABLET ORAL
Qty: 15 TABLET | Refills: 0 | Status: SHIPPED | OUTPATIENT
Start: 2021-12-20 | End: 2021-12-23

## 2021-12-20 RX ORDER — AMOXICILLIN AND CLAVULANATE POTASSIUM 875; 125 MG/1; MG/1
1 TABLET, FILM COATED ORAL 2 TIMES DAILY
Qty: 20 TABLET | Refills: 0 | Status: SHIPPED | OUTPATIENT
Start: 2021-12-20 | End: 2022-03-03

## 2021-12-20 RX ORDER — ALCOHOL ANTISEPTIC PADS
15 PADS, MEDICATED (EA) TOPICAL DAILY
Qty: 30 EACH | Refills: 0 | Status: SHIPPED | OUTPATIENT
Start: 2021-12-20 | End: 2022-03-03 | Stop reason: SDUPTHER

## 2021-12-20 RX ORDER — METOPROLOL TARTRATE 25 MG/1
25 TABLET, FILM COATED ORAL 2 TIMES DAILY
Qty: 60 TABLET | Refills: 0 | Status: SHIPPED | OUTPATIENT
Start: 2021-12-20 | End: 2022-03-03 | Stop reason: SDUPTHER

## 2021-12-20 RX ADMIN — INSULIN LISPRO 4 UNITS: 100 INJECTION, SOLUTION INTRAVENOUS; SUBCUTANEOUS at 12:33

## 2021-12-20 RX ADMIN — AMPICILLIN SODIUM AND SULBACTAM SODIUM 1.5 G: 1; .5 INJECTION, POWDER, FOR SOLUTION INTRAMUSCULAR; INTRAVENOUS at 06:02

## 2021-12-20 RX ADMIN — VANCOMYCIN HYDROCHLORIDE 1000 MG: 1 INJECTION, POWDER, LYOPHILIZED, FOR SOLUTION INTRAVENOUS at 00:58

## 2021-12-20 RX ADMIN — SODIUM CHLORIDE 100 ML/HR: 9 INJECTION, SOLUTION INTRAVENOUS at 06:07

## 2021-12-20 RX ADMIN — INSULIN LISPRO 4 UNITS: 100 INJECTION, SOLUTION INTRAVENOUS; SUBCUTANEOUS at 10:00

## 2021-12-20 RX ADMIN — AMPICILLIN SODIUM AND SULBACTAM SODIUM 1.5 G: 1; .5 INJECTION, POWDER, FOR SOLUTION INTRAMUSCULAR; INTRAVENOUS at 00:56

## 2021-12-20 RX ADMIN — METOPROLOL TARTRATE 25 MG: 25 TABLET, FILM COATED ORAL at 09:46

## 2021-12-20 RX ADMIN — Medication 10 ML: at 06:03

## 2021-12-20 NOTE — PROGRESS NOTES
Patient seen in bed  Symptomatically reports pain is improved    On examination both I&D sites are draining, markedly decreased induration and swelling    Assessment and plan:    Patient can be discharged home on oral antibiotics, recommend Augmentin 875/125 for 10 days    Ultrasound demonstrates an echogenic focus which is corresponds to was seen on CT, questionable hemangioma versus adenoma.   Patient cannot have a outpatient MRI to further qualify the lesion    Patient can follow-up with me in 10 days on December 29

## 2021-12-20 NOTE — DISCHARGE SUMMARY
Physician Discharge Summary     Patient ID:    Asher Gilliland  [id number]  51 y.o.  1975    Admit date: 12/17/2021    Discharge date : 12/20/2021    Chronic Diagnoses:    Problem List as of 12/20/2021 Date Reviewed: 12/18/2021          Codes Class Noted - Resolved    Abscess of female pelvis ICD-10-CM: N73.9  ICD-9-CM: 614.4  12/18/2021 - Present        Abscess ICD-10-CM: L02.91  ICD-9-CM: 682.9  12/17/2021 - Present        Cellulitis ICD-10-CM: L03.90  ICD-9-CM: 682.9  12/17/2021 - Present        Sepsis ([facility] ICD-10-CM: A41.9  ICD-9-CM: 038.9, 995.91  12/17/2021 - Present        Obesity, morbid ([facility] ICD-10-CM: E66.01  ICD-9-CM: 278.01  5/2/2018 - Present        Type 2 diabetes mellitus without complication ([facility] S-20-UM: E11.9  ICD-9-CM: 250.00  3/9/2016 - Present        Essential hypertension ICD-10-CM: I10  ICD-9-CM: 401.9  3/9/2016 - Present          22    Final Diagnoses:   Abscess [L02.91]  Cellulitis [L03.90]  Sepsis ([facility] [A41.9]  Abscess of female pelvis [N73.9]  Diabetes mellitus type 2, uncontrolled  Hypertension  SVT    Reason for Hospitalization:   Patient is a 56-year-old female with a history of diabetes mellitus, not on any medication as well as hypertension who presented to the ED on the evening of 12/17 complaining of an abscess in the left-sided vaginal area for the past week. She had been seen at an urgent care center recently and was given Augmentin and Bactrim which she took without improvement.     CT showed a large abscess centered on the left mons pubis measuring 4.0 x 4.1 x 9.4 cm. She was admitted, started on IV antibiotics with plans for surgical drainage     She was also found to have a nonspecific hypodense lesion in the posterior right hepatic lobe measuring 5.1 x 4.4 x 3.7 cm, with differential including hepatic abscess.         Hospital Course:   Patient was admitted to the medical floor and started on IV vancomycin.   Zosyn was added to her regimen although after the first dose of Zosyn she developed some palpitations and chest pain. EKG showed SVT which lasted about 10 minutes    Her blood pressure was elevated and she was started on metoprolol. Patient is a diabetic but stopped her insulin some time ago and therefore was started back on insulin therapy during this admission    I switched Zosyn to Unasyn. She was seen by general surgery and underwent bedside I&D of the vaginal abscess on 12/19    Ultrasound of the liver was obtained which showed a hypodense lesion consistent with either adenoma versus hemangioma versus focal steatosis. MRI was attempted but patient could not tolerate it due to inability to lie still. MRI will be rescheduled in the outpatient setting for further evaluation    Patient was felt appropriate for discharge home on 12/20                  Discharge Medications:   Current Discharge Medication List      START taking these medications    Details   oxyCODONE IR (ROXICODONE) 5 mg immediate release tablet Take 1 Tablet by mouth every four (4) hours as needed for Pain for up to 3 days. Max Daily Amount: 30 mg.  Qty: 15 Tablet, Refills: 0  Start date: 12/20/2021, End date: 12/23/2021    Associated Diagnoses: Abscess of female pelvis      metoprolol tartrate (LOPRESSOR) 25 mg tablet Take 1 Tablet by mouth two (2) times a day. Indications: high blood pressure, paroxysmal supraventricular tachycardia  Qty: 60 Tablet, Refills: 0  Start date: 12/20/2021      insulin glargine (Lantus Solostar U-100 Insulin) 100 unit/mL (3 mL) inpn Inject 15 units daily  Qty: 1 Adjustable Dose Pre-filled Pen Syringe, Refills: 0  Start date: 12/20/2021      Insulin Needles, Disposable, (Comfort EZ Pen Needles) 32 gauge x 5/16\" ndle 15 Units by Does Not Apply route daily.   Qty: 30 Each, Refills: 0  Start date: 12/20/2021         CONTINUE these medications which have CHANGED    Details   amoxicillin-clavulanate (AUGMENTIN) 875-125 mg per tablet Take 1 Tablet by mouth two (2) times a day. Qty: 20 Tablet, Refills: 0  Start date: 12/20/2021         STOP taking these medications       trimethoprim-sulfamethoxazole (BACTRIM DS, SEPTRA DS) 160-800 mg per tablet Comments:   Reason for Stopping: Follow up Care:    1. Marcin Carter, DO in 1-2 weeks. Please call to set up an appointment shortly after discharge. Diet:  Diabetic Diet    Disposition:  Home. Advanced Directive:   FULL    DNR      Discharge Exam:  General:  Alert, cooperative, no distress, appears stated age. Lungs:   Clear to auscultation bilaterally. Chest wall:  No tenderness or deformity. Heart:  Regular rate and rhythm, S1, S2 normal, no murmur, click, rub or gallop. Abdomen:   Soft, non-tender. Bowel sounds normal. No masses,  No organomegaly. Extremities: Extremities normal, atraumatic, no cyanosis or edema. Pulses: 2+ and symmetric all extremities. Skin: Skin color, texture, turgor normal. No rashes or lesions   Neurologic: CNII-XII intact. No gross sensory or motor deficits        CONSULTATIONS: General Surgery and cardiology    Significant Diagnostic Studies:   12/17/2021: BUN 8 mg/dL (Ref range: 6 - 20 mg/dL); Calcium 8.8 mg/dL (Ref range: 8.5 - 10.1 mg/dL); CO2 26 mmol/L (Ref range: 21 - 32 mmol/L); Creatinine 1.00 mg/dL (Ref range: 0.55 - 1.02 mg/dL); Glucose 311 mg/dL (H; Ref range: 65 - 100 mg/dL); HCT 32.0 % (L; Ref range: 35.0 - 47.0 %); HGB 10.8 g/dL (L; Ref range: 11.5 - 16.0 g/dL); Potassium 3.9 mmol/L (Ref range: 3.5 - 5.1 mmol/L); Sodium 137 mmol/L (Ref range: 136 - 145 mmol/L)  12/18/2021: BUN 6 mg/dL (Ref range: 6 - 20 mg/dL); Calcium 8.7 mg/dL (Ref range: 8.5 - 10.1 mg/dL); CO2 23 mmol/L (Ref range: 21 - 32 mmol/L); Creatinine 0.82 mg/dL (Ref range: 0.55 - 1.02 mg/dL); Glucose 299 mg/dL (H; Ref range: 65 - 100 mg/dL); HCT 31.5 % (L; Ref range: 35.0 - 47.0 %); HGB 10.1 g/dL (L; Ref range: 11.5 - 16.0 g/dL);  Potassium 3.8 mmol/L (Ref range: 3.5 - 5.1 mmol/L); Sodium 138 mmol/L (Ref range: 136 - 145 mmol/L)  Recent Labs     12/20/21  0237 12/19/21  0659   WBC 11.0 14.5*   HGB 9.6* 9.7*   HCT 28.5* 29.3*    190     Recent Labs     12/19/21  0659 12/18/21  0625 12/17/21  1517    138 137   K 3.6 3.8 3.9    106 99   CO2 26 23 26   BUN 6 6 8   CREA 0.66 0.82 1.00   * 299* 311*   CA 8.5 8.7 8.8     Recent Labs     12/18/21  0625   ALT 12      TBILI 0.6   TP 7.2   ALB 2.3*   GLOB 4.9*     No results for input(s): INR, PTP, APTT, INREXT in the last 72 hours. No results for input(s): FE, TIBC, PSAT, FERR in the last 72 hours. No results for input(s): PH, PCO2, PO2 in the last 72 hours. No results for input(s): CPK, CKMB in the last 72 hours.     No lab exists for component: TROPONINI  Lab Results   Component Value Date/Time    Glucose (POC) 233 (H) 12/20/2021 09:54 AM    Glucose (POC) 248 (H) 12/19/2021 09:58 PM    Glucose (POC) 275 (H) 12/19/2021 03:50 PM    Glucose (POC) 263 (H) 12/19/2021 07:48 AM    Glucose (POC) 270 (H) 12/18/2021 08:05 PM       Discharge time spent 35 minutes    Signed:  Jone Dunlap MD  12/20/2021  11:04 AM

## 2021-12-20 NOTE — PROGRESS NOTES
Discharge plan of care/case management plan validated with provider discharge order. Discharge instructions reviewed with patient. voice understanding. States son will pick her up.

## 2021-12-20 NOTE — PROGRESS NOTES
Progress Note      12/20/2021 12:24 PM  NAME: Ammy Wall   MRN:  [id number]   Admit Diagnosis: Abscess [L02.91]  Cellulitis [L03.90]  [facility] [A41.9]  Abscess of female pelvis [N73.9]      Problem List:   1.  Medication noncompliance prior to admission  2.  Vaginal abscess/cellulitis  3.  Sepsis  4.  Chest pain  5.  Paroxysmal supraventricular tachycardia  6.  Hypertension  7.  Dyslipidemia  8.  Type 2 diabetes  9.  Family history of premature coronary disease (mother with CAD at age 48)  8.  Obstructive sleep apnea requiring CPAP     11.  Severe obesity (BMI =43.85 kg/m² )  12. Anastacia Josep  13.  Depression   14.  Leukocytosis  15.  Anemia       Subjective: The patient is seen and examined in the ultrasound department. There were no acute cardiac events reported overnight. Currently, the patient denies any cardiovascular complaints. Specifically, she denies chest pain or shortness of breath. Further there is no awareness of rapid heart rate, palpitations or missed beats. Yesterday, the patient was started on metoprolol for blood pressure, and rate control. She has tolerated well. Overnight, telemetry revealed an episode of nonsustained ventricular tachycardia (9 consecutive premature ventricular complex). Awaiting complete 2-D echocardiogram.  Discussed with RN events overnight.      Medications Personally Reviewed:    Current Facility-Administered Medications   Medication Dose Route Frequency    metoprolol tartrate (LOPRESSOR) tablet 25 mg  25 mg Oral Q12H    sodium chloride (NS) flush 5-40 mL  5-40 mL IntraVENous Q8H    sodium chloride (NS) flush 5-40 mL  5-40 mL IntraVENous PRN    acetaminophen (TYLENOL) solution 650 mg  650 mg Oral Q4H PRN    oxyCODONE IR (ROXICODONE) tablet 5 mg  5 mg Oral Q4H PRN    insulin lispro (HUMALOG) injection   SubCUTAneous AC&HS    glucose chewable tablet 16 g  4 Tablet Oral PRN    dextrose (D50W) injection syrg 12.5-25 g  25-50 mL IntraVENous PRN    glucagon (GLUCAGEN) injection 1 mg  1 mg IntraMUSCular PRN    0.9% sodium chloride infusion  100 mL/hr IntraVENous CONTINUOUS    vancomycin (VANCOCIN) 1,000 mg in 0.9% sodium chloride 250 mL (VIAL-MATE)  1,000 mg IntraVENous Q12H    Vancomycin - Trough to be drawn prior to dose 12/20 @ 1300   Other ONCE    ampicillin-sulbactam (UNASYN) 1.5 g in 0.9% sodium chloride (MBP/ADV) 50 mL MBP  1.5 g IntraVENous Q6H    Vancomycin dose per pharmacy policy   Other Rx Dosing/Monitoring           Objective:      Physical Exam:  Essentially unchanged  Last 24hrs VS reviewed since prior progress note. Most recent are:    Visit Vitals  /70 (BP 1 Location: Left upper arm, BP Patient Position: Lying right side)   Pulse 86   Temp 98.2 °F (36.8 °C)   Resp 20   Ht 5' 7\" (1.702 m)   Wt 127 kg (280 lb)   LMP 12/01/2021 (Exact Date)   SpO2 100%   BMI 43.85 kg/m²     No intake or output data in the 24 hours ending 12/20/21 1224     Data Review    Telemetry: Sinus rhythm with episode of nonsustained ventricular tachycardia (NSVT). EKG:   []  No new EKG for review    Lab Data Personally Reviewed:    Recent Labs     12/20/21  0237 12/19/21  0659   WBC 11.0 14.5*   HGB 9.6* 9.7*   HCT 28.5* 29.3*    190     No results for input(s): INR, PTP, APTT, INREXT in the last 72 hours. Recent Labs     12/19/21  0659 12/18/21  0625 12/17/21  1517    138 137   K 3.6 3.8 3.9    106 99   CO2 26 23 26   BUN 6 6 8   CREA 0.66 0.82 1.00   * 299* 311*   CA 8.5 8.7 8.8     No results for input(s): CPK, CKNDX, TROIQ in the last 72 hours.     No lab exists for component: CPKMB  Lab Results   Component Value Date/Time    Cholesterol, total 168 06/20/2019 10:06 AM    HDL Cholesterol 41 06/20/2019 10:06 AM    LDL, calculated 113 (H) 06/20/2019 10:06 AM    Triglyceride 69 06/20/2019 10:06 AM       Recent Labs     12/18/21  0625      TP 7.2   ALB 2.3*   GLOB 4.9*     No results for input(s): PH, PCO2, PO2 in the last 72 hours. Assessment/Plan:   1. Continue telemetry monitoring  2. Continue to monitor serum electrolytes, renal function  3. Obtain complete 2-D echocardiogram  4. Continue current cardiovascular medications including insulin therapy, and metoprolol  5.   Further recommendations depending on above results, clinical course     Stevan Thompson MD

## 2021-12-20 NOTE — DISCHARGE INSTRUCTIONS
Patient Education        Skin Abscess: Care Instructions  Your Care Instructions     A skin abscess is a bacterial infection that forms a pocket of pus. A boil is a kind of skin abscess. The doctor may have cut an opening in the abscess so that the pus can drain out. You may have gauze in the cut so that the abscess will stay open and keep draining. You may need antibiotics. You will need to follow up with your doctor to make sure the infection has gone away. The doctor has checked you carefully, but problems can develop later. If you notice any problems or new symptoms, get medical treatment right away. Follow-up care is a key part of your treatment and safety. Be sure to make and go to all appointments, and call your doctor if you are having problems. It's also a good idea to know your test results and keep a list of the medicines you take. How can you care for yourself at home? · Apply warm and dry compresses, a heating pad set on low, or a hot water bottle 3 or 4 times a day for pain. Keep a cloth between the heat source and your skin. · If your doctor prescribed antibiotics, take them as directed. Do not stop taking them just because you feel better. You need to take the full course of antibiotics. · Take pain medicines exactly as directed. ? If the doctor gave you a prescription medicine for pain, take it as prescribed. ? If you are not taking a prescription pain medicine, ask your doctor if you can take an over-the-counter medicine. · Keep your bandage clean and dry. Change the bandage whenever it gets wet or dirty, or at least one time a day. · If the abscess was packed with gauze:  ? Keep follow-up appointments to have the gauze changed or removed. If the doctor instructed you to remove the gauze, follow the instructions you were given for how to remove it. ? After the gauze is removed, soak the area in warm water for 15 to 20 minutes 2 times a day, until the wound closes.   When should you call for help? Call your doctor now or seek immediate medical care if:    · You have signs of worsening infection, such as:  ? Increased pain, swelling, warmth, or redness. ? Red streaks leading from the infected skin. ? Pus draining from the wound. ? A fever. Watch closely for changes in your health, and be sure to contact your doctor if:    · You do not get better as expected. Where can you learn more? Go to http://www.gray.com/  Enter P563 in the search box to learn more about \"Skin Abscess: Care Instructions. \"  Current as of: March 3, 2021               Content Version: 13.0  © 1710-1922 [employer]. Care instructions adapted under license by [employer] (which disclaims liability or warranty for this information). If you have questions about a medical condition or this instruction, always ask your healthcare professional. [address] any warranty or liability for your use of this information. Thank you! Thank you for allowing me to care for you in the emergency department. I sincerely hope that you are satisfied with your visit today. It is my goal to provide you with excellent care. Below you will find a list of your labs and imaging from your visit today. Should you have any questions regarding these results please do not hesitate to call the emergency department.     Labs -     Recent Results (from the past 12 hour(s))   GLUCOSE, POC    Collection Time: 12/17/21 11:55 AM   Result Value Ref Range    Glucose (POC) 313 (H) 65 - 117 mg/dL    Performed by Samantha Mills    CBC WITH AUTOMATED DIFF    Collection Time: 12/17/21  3:17 PM   Result Value Ref Range    WBC 17.5 (H) 3.6 - 11.0 K/uL    RBC 3.60 (L) 3.80 - 5.20 M/uL    HGB 10.8 (L) 11.5 - 16.0 g/dL    HCT 32.0 (L) 35.0 - 47.0 %    MCV 88.9 80.0 - 99.0 FL    MCH 30.0 26.0 - 34.0 PG    MCHC 33.8 30.0 - 36.5 g/dL    RDW 12.5 11.5 - 14.5 %    PLATELET 979 246 - 900 K/uL MPV 11.8 8.9 - 12.9 FL    NEUTROPHILS 86 (H) 32 - 75 %    LYMPHOCYTES 6 (L) 12 - 49 %    MONOCYTES 7 5 - 13 %    EOSINOPHILS 0 0 - 7 %    BASOPHILS 0 0 - 1 %    IMMATURE GRANULOCYTES 1 (H) 0.0 - 0.5 %    ABS. NEUTROPHILS 15.0 (H) 1.8 - 8.0 K/UL    ABS. LYMPHOCYTES 1.1 0.8 - 3.5 K/UL    ABS. MONOCYTES 1.3 (H) 0.0 - 1.0 K/UL    ABS. EOSINOPHILS 0.0 0.0 - 0.4 K/UL    ABS. BASOPHILS 0.0 0.0 - 0.1 K/UL    ABS. IMM. GRANS. 0.1 (H) 0.00 - 0.04 K/UL    DF AUTOMATED     METABOLIC PANEL, BASIC    Collection Time: 12/17/21  3:17 PM   Result Value Ref Range    Sodium 137 136 - 145 mmol/L    Potassium 3.9 3.5 - 5.1 mmol/L    Chloride 99 97 - 108 mmol/L    CO2 26 21 - 32 mmol/L    Anion gap 12 5 - 15 mmol/L    Glucose 311 (H) 65 - 100 mg/dL    BUN 8 6 - 20 mg/dL    Creatinine 1.00 0.55 - 1.02 mg/dL    BUN/Creatinine ratio 8 (L) 12 - 20      GFR est AA >60 >60 ml/min/1.73m2    GFR est non-AA 59 (L) >60 ml/min/1.73m2    Calcium 8.8 8.5 - 10.1 mg/dL   LACTIC ACID    Collection Time: 12/17/21  3:17 PM   Result Value Ref Range    Lactic acid 1.1 0.4 - 2.0 mmol/L       Radiologic Studies -   CT ABD PELV W CONT   Final Result   1. Subcutaneous edema and fluid centered on the left mons pubis with surrounding   inflammatory stranding. This most likely represents phlegmon/organizing abscess. No definite rim enhancement to confirm well-formed abscess at this time. 2. Indeterminate lesion in the posterior right hepatic lobe. Hepatic abscess is   in the differential diagnosis. Dedicated multiphasic contrast enhanced CT or MRI   of the liver could better characterize. 3. Please see above for additional comments and chronic findings. CT Results  (Last 48 hours)                 12/17/21 1638  CT ABD PELV W CONT Final result    Impression:  1. Subcutaneous edema and fluid centered on the left mons pubis with surrounding   inflammatory stranding. This most likely represents phlegmon/organizing abscess.    No definite rim enhancement to confirm well-formed abscess at this time. 2. Indeterminate lesion in the posterior right hepatic lobe. Hepatic abscess is   in the differential diagnosis. Dedicated multiphasic contrast enhanced CT or MRI   of the liver could better characterize. 3. Please see above for additional comments and chronic findings. Narrative:  Exam: CT ABD PELV W CONT       TECHNIQUE: Multiple transaxial CT images of the abdomen and pelvis were obtained   following the uneventful administration of 100 mL Isovue 370 intravenous   contrast. Coronal and sagittal reformatted images were provided. Dose reduction: All CT scans at this facility are performed using dose reduction   optimization techniques as appropriate to a performed exam including the   following: Automated exposure control, adjustments of the mA and/or kV according   to patient size, or use of iterative reconstruction technique. COMPARISON: None       HISTORY: Large abscess, extends from mons pubis inferiorly and to lower labia       FINDINGS:       Lower thorax: Favored linear pleural-based scarring/subsegmental atelectasis in   the lung bases. Visualized portions of the heart appear enlarged. Abdomen and pelvis:   Liver: There is a nonspecific 5.1 x 4.4 x 3.7 cm irregular hypodense lesion   within the posterior right hepatic lobe. Gallbladder: Surgically absent. Biliary system: The common duct is mildly enlarged measuring up to 7 mm,   nonspecific, but could be related to reservoir effect from prior   cholecystectomy. Pancreas: Unremarkable. Spleen: Unremarkable. Adrenal glands: Normal.   Kidneys and ureters: Water density cyst measuring up to 3.5 cm within the left   kidney. Other subcentimeter well-circumscribed hypodensities that are too small   to further characterize. The kidneys otherwise enhance symmetrically. No   hydronephrosis or hydroureter is evident.    Urinary bladder: Partially distended but otherwise unremarkable. Reproductive organs: Unremarkable. Bowel: No findings of mechanical bowel obstruction. No findings of appendicitis. Peritoneum: No pneumoperitoneum. No free fluid. Lymph nodes: No abdominal/pelvic lymphadenopathy. Aorta and other vessels: The aorta is within normal limits. The systemic and   portal venous systems are opacified. Proximal splanchnic vessels appear patent. Bones: Degenerative changes within the spine, hips, and pelvis. No acute or   aggressive osseous abnormalities are evident. Superficial soft tissues: Centered on the left mons pubis there is an area of   subcutaneous edema and fluid without definite rim enhancement measuring up to   approximately 4.0 x 4.1 x 9.4 cm. There is surrounding inflammatory stranding   within the subcutaneous fat. Mildly prominent left inguinal lymph nodes are most   likely reactive. CXR Results  (Last 48 hours)      None               If you feel that you have not received excellent quality care or timely care, please ask to speak to the nurse manager. Please choose us in the future for your continued health care needs. ------------------------------------------------------------------------------------------------------------  The exam and treatment you received in the Emergency Department were for an urgent problem and are not intended as complete care. It is important that you follow-up with a doctor, nurse practitioner, or physician assistant to:  (1) confirm your diagnosis,  (2) re-evaluation of changes in your illness and treatment, and  (3) for ongoing care. If your symptoms become worse or you do not improve as expected and you are unable to reach your usual health care provider, you should return to the Emergency Department. We are available 24 hours a day. Please take your discharge instructions with you when you go to your follow-up appointment.      If you have any problem arranging a follow-up appointment, contact the Emergency Department immediately. If a prescription has been provided, please have it filled as soon as possible to prevent a delay in treatment. Read the entire medication instruction sheet provided to you by the pharmacy. If you have any questions or reservations about taking the medication due to side effects or interactions with other medications, please call your primary care physician or contact the ER to speak with the charge nurse. Make an appointment with your family doctor or the physician you were referred to for follow-up of this visit as instructed on your discharge paperwork, as this is a mandatory follow-up. Return to the ER if you are unable to be seen or if you are unable to be seen in a timely manner. If you have any problem arranging the follow-up visit, contact the Emergency Department immediately.

## 2021-12-20 NOTE — PROGRESS NOTES
Patient remains out of bed sitting up in chair. IV site discontinued per patient request. States  gets off work at 5 pm and will pick her up.

## 2021-12-20 NOTE — PROGRESS NOTES
09: 54 Patient  sitting up in chair at bedside. States had US done questioned whether still need the MRI. informed that MRI wants to know if she still want to have it they will send for her after lunch time. Reassured will check with Dr Audie Chery and see. Dr. Audie Chery on unit informed of patients concern states she may still need it but it can be done as an out patient. But it will be up to Dr. Christel Cain. . Patient Blood sugar checked(see POC). she had already eating breakfast. State had orange juice and 3 strips of Limon. Coverage given.

## 2021-12-24 LAB
BACTERIA SPEC CULT: NORMAL
SPECIAL REQUESTS,SREQ: NORMAL

## 2022-01-18 ENCOUNTER — VIRTUAL VISIT (OUTPATIENT)
Dept: FAMILY MEDICINE CLINIC | Age: 47
End: 2022-01-18
Payer: COMMERCIAL

## 2022-01-18 DIAGNOSIS — L02.91 ABSCESS: ICD-10-CM

## 2022-01-18 DIAGNOSIS — N39.0 URINARY TRACT INFECTION WITHOUT HEMATURIA, SITE UNSPECIFIED: Primary | ICD-10-CM

## 2022-01-18 PROCEDURE — 99214 OFFICE O/P EST MOD 30 MIN: CPT | Performed by: FAMILY MEDICINE

## 2022-01-18 RX ORDER — DOXYCYCLINE 100 MG/1
100 CAPSULE ORAL 2 TIMES DAILY
Qty: 14 CAPSULE | Refills: 0 | Status: SHIPPED | OUTPATIENT
Start: 2022-01-18 | End: 2022-01-25

## 2022-01-18 NOTE — PROGRESS NOTES
Progress Note    she is a 51y.o. year old female who presents for evalution. Subjective:     Patient here for follow-up from recent hospitalization for pubic abscess. She states this has resolved completely now. She finished her antibiotics. States she has burning with urination increased frequency no hematuria. Despite having been on antibiotic she states this is not gotten better. No fever no chills. Reviewed PmHx, RxHx, FmHx, SocHx, AllgHx and updated and dated in the chart. Review of Systems - negative except as listed above in the HPI    Objective: There were no vitals filed for this visit. Current Outpatient Medications   Medication Sig    doxycycline (VIBRAMYCIN) 100 mg capsule Take 1 Capsule by mouth two (2) times a day for 7 days.  amoxicillin-clavulanate (AUGMENTIN) 875-125 mg per tablet Take 1 Tablet by mouth two (2) times a day.  metoprolol tartrate (LOPRESSOR) 25 mg tablet Take 1 Tablet by mouth two (2) times a day. Indications: high blood pressure, paroxysmal supraventricular tachycardia    insulin glargine (Lantus Solostar U-100 Insulin) 100 unit/mL (3 mL) inpn Inject 15 units daily    Insulin Needles, Disposable, (Comfort EZ Pen Needles) 32 gauge x 5/16\" ndle 15 Units by Does Not Apply route daily. No current facility-administered medications for this visit. Physical Examination: General appearance - alert, well appearing, and in no distress  Mental status - alert, oriented to person, place, and time      Assessment/ Plan:   Diagnoses and all orders for this visit:    1. Urinary tract infection without hematuria, site unspecified  -     doxycycline (VIBRAMYCIN) 100 mg capsule; Take 1 Capsule by mouth two (2) times a day for 7 days. -     CULTURE, URINE; Future  She will go give a urine sample for culture prior to starting the antibiotics. 2. Abscess  Underwent bedside incision and drainage. Resolved.      Follow-up and Dispositions    · Return if symptoms worsen or fail to improve. I have discussed the diagnosis with the patient and the intended plan as seen in the above orders. The patient has received an after-visit summary and questions were answered concerning future plans. Pt conveyed understanding of plan. Medication Side Effects and Warnings were discussed with patient      Dianne Browning is being evaluated by a Virtual Visit (video visit) encounter to address concerns as mentioned above. A caregiver was present when appropriate. Due to this being a TeleHealth encounter (During OFB-55 public health emergency), evaluation of the following organ systems was limited: Vitals/Constitutional/EENT/Resp/CV/GI//MS/Neuro/Skin/Heme-Lymph-Imm. Pursuant to the emergency declaration under the [address] [place] [address], [address] [place] [address] authority and the [employer] Act, this Virtual Visit was conducted with patient's (and/or legal guardian's) consent, to reduce the patient's risk of exposure to COVID-19 and provide necessary medical care. The patient (and/or legal guardian) has also been advised to contact this office for worsening conditions or problems, and seek emergency medical treatment and/or call 911 if deemed necessary. Patient identification was verified at the start of the visit: Yes    Services were provided through a video synchronous discussion virtually to substitute for in-person clinic visit. Patient and provider were located at their individual homes.   --Keyon Guidry DO on 1/18/2022 at 2:20 PM

## 2022-01-18 NOTE — PATIENT INSTRUCTIONS
A Healthy Lifestyle: Care Instructions  Your Care Instructions     A healthy lifestyle can help you feel good, stay at a healthy weight, and have plenty of energy for both work and play. A healthy lifestyle is something you can share with your whole family. A healthy lifestyle also can lower your risk for serious health problems, such as high blood pressure, heart disease, and diabetes. You can follow a few steps listed below to improve your health and the health of your family. Follow-up care is a key part of your treatment and safety. Be sure to make and go to all appointments, and call your doctor if you are having problems. It's also a good idea to know your test results and keep a list of the medicines you take. How can you care for yourself at home? · Do not eat too much sugar, fat, or fast foods. You can still have dessert and treats now and then. The goal is moderation. · Start small to improve your eating habits. Pay attention to portion sizes, drink less juice and soda pop, and eat more fruits and vegetables. ? Eat a healthy amount of food. A 3-ounce serving of meat, for example, is about the size of a deck of cards. Fill the rest of your plate with vegetables and whole grains. ? Limit the amount of soda and sports drinks you have every day. Drink more water when you are thirsty. ? Eat plenty of fruits and vegetables every day. Have an apple or some carrot sticks as an afternoon snack instead of a candy bar. Try to have fruits and/or vegetables at every meal.  · Make exercise part of your daily routine. You may want to start with simple activities, such as walking, bicycling, or slow swimming. Try to be active 30 to 60 minutes every day. You do not need to do all 30 to 60 minutes all at once. For example, you can exercise 3 times a day for 10 or 20 minutes.  Moderate exercise is safe for most people, but it is always a good idea to talk to your doctor before starting an exercise program.  · Keep moving. Rachelle Wilkinsons the lawn, work in the garden, or [place]. Take the stairs instead of the elevator at work. · If you smoke, quit. People who smoke have an increased risk for heart attack, stroke, cancer, and other lung illnesses. Quitting is hard, but there are ways to boost your chance of quitting tobacco for good. ? Use nicotine gum, patches, or lozenges. ? Ask your doctor about stop-smoking programs and medicines. ? Keep trying. In addition to reducing your risk of diseases in the future, you will notice some benefits soon after you stop using tobacco. If you have shortness of breath or asthma symptoms, they will likely get better within a few weeks after you quit. · Limit how much alcohol you drink. Moderate amounts of alcohol (up to 2 drinks a day for men, 1 drink a day for women) are okay. But drinking too much can lead to liver problems, high blood pressure, and other health problems. Family health  If you have a family, there are many things you can do together to improve your health. · Eat meals together as a family as often as possible. · Eat healthy foods. This includes fruits, vegetables, lean meats and dairy, and whole grains. · Include your family in your fitness plan. Most people think of activities such as jogging or tennis as the way to fitness, but there are many ways you and your family can be more active. Anything that makes you breathe hard and gets your heart pumping is exercise. Here are some tips:  ? Walk to do errands or to take your child to school or the bus.  ? Go for a family bike ride after dinner instead of watching TV. Where can you learn more? Go to http://www.gray.com/  Enter Z853 in the search box to learn more about \"A Healthy Lifestyle: Care Instructions. \"  Current as of: June 16, 2021               Content Version: 13.0  © 3997-2277 [employer].    Care instructions adapted under license by [employer] (which disclaims liability or warranty for this information). If you have questions about a medical condition or this instruction, always ask your healthcare professional. [address] any warranty or liability for your use of this information.

## 2022-01-29 LAB — BACTERIA UR CULT: NORMAL

## 2022-03-02 ENCOUNTER — NURSE TRIAGE (OUTPATIENT)
Dept: OTHER | Facility: CLINIC | Age: 47
End: 2022-03-02

## 2022-03-02 NOTE — TELEPHONE ENCOUNTER
Received call from jose carlos at [facility] with Red Flag Complaint. Subjective: Caller states \"my feet be jumping tingling cold. I really think I have neuropathy in feet. Going on a few months started getting really bad. A lot of burning. \"     Current Symptoms: Patient reports pain in both feet. Pt reports waking up at 4 am to APAP to be able to go to sleep. Pt denies weakness in feet. Pt reports went to ER and was told had issues with heart fluttering when this happens become short of breath. Pt was seen in ER december. not happening right now. Pt reports the most recent occurrence of fluttering of chest lasting a couple seconds which also included shortness of breath that resolved. Pt denies chest pains right now. Pt denies shortness of breath right now. Onset: a couple months     Associated Symptoms: increased wakefulness    Pain Severity: 8-9/10; burning- aching ; intermittent- mostly at night pt reports during the day is fine     Temperature: no fever     What has been tried: APAP    LMP: first of feb Pregnant: No    Recommended disposition: see HCP within 4 hours - if unable to be seen recommended urgent care or emergency room     Care advice provided, patient verbalizes understanding; denies any other questions or concerns; instructed to call back for any new or worsening symptoms. Patient/Caller agrees with recommended disposition; writer provided warm transfer to [employer]  at [facility] for appointment scheduling    Attention Provider: Thank you for allowing me to participate in the care of your patient. The patient was connected to triage in response to information provided to the [facility]. Please do not respond through this encounter as the response is not directed to a shared pool. Reason for Disposition   [1] Numbness or tingling in one or both feet AND [2] is a chronic symptom (recurrent or ongoing AND present > 4 weeks)     It also accompanies burning pains in both feets.    [1] Skipped or extra beat(s) AND [2] increases with exercise or exertion     Patient reports sometimes gets fluttering when exerting energy sometimes at rest when gets these gets short of breath a couple seconds and resolves.  And not happening now    Protocols used: NEUROLOGIC DEFICIT-ADULT-AH, HEART RATE AND HEARTBEAT QUESTIONS-ADULT-AH

## 2022-03-03 ENCOUNTER — OFFICE VISIT (OUTPATIENT)
Dept: FAMILY MEDICINE CLINIC | Age: 47
End: 2022-03-03
Payer: COMMERCIAL

## 2022-03-03 VITALS
OXYGEN SATURATION: 98 % | WEIGHT: 285 LBS | TEMPERATURE: 98.5 F | HEART RATE: 75 BPM | SYSTOLIC BLOOD PRESSURE: 164 MMHG | RESPIRATION RATE: 18 BRPM | DIASTOLIC BLOOD PRESSURE: 81 MMHG | HEIGHT: 67 IN | BODY MASS INDEX: 44.73 KG/M2

## 2022-03-03 DIAGNOSIS — Z79.4 TYPE 2 DIABETES MELLITUS WITH HYPERGLYCEMIA, WITH LONG-TERM CURRENT USE OF INSULIN (HCC): ICD-10-CM

## 2022-03-03 DIAGNOSIS — E11.65 TYPE 2 DIABETES MELLITUS WITH HYPERGLYCEMIA, WITH LONG-TERM CURRENT USE OF INSULIN (HCC): ICD-10-CM

## 2022-03-03 DIAGNOSIS — I47.1 SVT (SUPRAVENTRICULAR TACHYCARDIA) (HCC): Primary | ICD-10-CM

## 2022-03-03 DIAGNOSIS — E11.42 DIABETIC PERIPHERAL NEUROPATHY ASSOCIATED WITH TYPE 2 DIABETES MELLITUS (HCC): ICD-10-CM

## 2022-03-03 LAB — GLUCOSE POC: 254 MG/DL

## 2022-03-03 PROCEDURE — 82962 GLUCOSE BLOOD TEST: CPT | Performed by: FAMILY MEDICINE

## 2022-03-03 PROCEDURE — 99214 OFFICE O/P EST MOD 30 MIN: CPT | Performed by: FAMILY MEDICINE

## 2022-03-03 RX ORDER — IBUPROFEN 200 MG
CAPSULE ORAL
Qty: 100 STRIP | Refills: 11 | Status: SHIPPED | OUTPATIENT
Start: 2022-03-03

## 2022-03-03 RX ORDER — INSULIN PUMP SYRINGE, 3 ML
EACH MISCELLANEOUS
Qty: 1 KIT | Refills: 0 | Status: SHIPPED | OUTPATIENT
Start: 2022-03-03

## 2022-03-03 RX ORDER — GABAPENTIN 100 MG/1
100 CAPSULE ORAL
Qty: 30 CAPSULE | Refills: 1 | Status: SHIPPED | OUTPATIENT
Start: 2022-03-03 | End: 2022-05-26

## 2022-03-03 RX ORDER — INSULIN GLARGINE 100 [IU]/ML
INJECTION, SOLUTION SUBCUTANEOUS
Qty: 3 ADJUSTABLE DOSE PRE-FILLED PEN SYRINGE | Refills: 2 | Status: SHIPPED | OUTPATIENT
Start: 2022-03-03 | End: 2022-04-11

## 2022-03-03 RX ORDER — METOPROLOL TARTRATE 25 MG/1
25 TABLET, FILM COATED ORAL 2 TIMES DAILY
Qty: 60 TABLET | Refills: 1 | Status: SHIPPED | OUTPATIENT
Start: 2022-03-03 | End: 2022-05-26

## 2022-03-03 RX ORDER — LANCETS
EACH MISCELLANEOUS
Qty: 100 EACH | Refills: 11 | Status: SHIPPED | OUTPATIENT
Start: 2022-03-03 | End: 2022-03-15 | Stop reason: SDUPTHER

## 2022-03-03 RX ORDER — DULAGLUTIDE 0.75 MG/.5ML
0.75 INJECTION, SOLUTION SUBCUTANEOUS
Qty: 4 PEN | Refills: 1 | Status: SHIPPED | OUTPATIENT
Start: 2022-03-03 | End: 2022-04-11

## 2022-03-03 RX ORDER — ALCOHOL ANTISEPTIC PADS
PADS, MEDICATED (EA) TOPICAL
Qty: 30 EACH | Refills: 11 | Status: SHIPPED | OUTPATIENT
Start: 2022-03-03 | End: 2022-04-11 | Stop reason: SDUPTHER

## 2022-03-03 NOTE — PROGRESS NOTES
Progress Note    she is a 51y.o. year old female who presents for evalution. Subjective:     Pt is a poorly controlled diabetic not taking her insulin has not followed up in almost 3 years. Now presenting with peripheral neuropathy in her feet. Burning and jumping feeling in feet. She states she cut out soda completely 8 months now. Was drinking 8 cans a day. Does crystal light and water now. Pt states she craves food. Had SVT in hospital after zosyn was given toprol and finished this. She states she feels like it come son at times at night, no CP no dizzy spells. Apparently runs in family per pt. Sister and mom have it too. Reviewed PmHx, RxHx, FmHx, SocHx, AllgHx and updated and dated in the chart. Review of Systems - negative except as listed above in the HPI    Objective:     Vitals:    03/03/22 1024 03/03/22 1058   BP: (!) 151/78 (!) 164/81   Pulse: 75    Resp: 18    Temp: 98.5 °F (36.9 °C)    TempSrc: Oral    SpO2: 98%    Weight: 285 lb (129.3 kg)    Height: 5' 7\" (1.702 m)        Current Outpatient Medications   Medication Sig    metoprolol tartrate (LOPRESSOR) 25 mg tablet Take 1 Tablet by mouth two (2) times a day. Indications: high blood pressure, paroxysmal supraventricular tachycardia    gabapentin (NEURONTIN) 100 mg capsule Take 1 Capsule by mouth nightly. Max Daily Amount: 100 mg.    insulin glargine (Lantus Solostar U-100 Insulin) 100 unit/mL (3 mL) inpn Inject 20 units daily    dulaglutide (Trulicity) 3.93 LH/6.2 mL sub-q pen 0.5 mL by SubCUTAneous route every seven (7) days.     Insulin Needles, Disposable, (Comfort EZ Pen Needles) 32 gauge x 5/16\" ndle 1 shot daily    Blood-Glucose Meter monitoring kit Check BG bid IDDM E11.65 whichever insurance prefers    lancets misc Check BG bid IDDM E11.65 whichever insurance prefers    glucose blood VI test strips (blood glucose test) strip Check BG bid IDDM E11.65 whichever insurance prefers     No current facility-administered medications for this visit. Physical Examination: General appearance - alert, well appearing, and in no distress  Mental status - alert, oriented to person, place, and time      Assessment/ Plan:   Diagnoses and all orders for this visit:    1. SVT (supraventricular tachycardia) ([facility])  -     metoprolol tartrate (LOPRESSOR) 25 mg tablet; Take 1 Tablet by mouth two (2) times a day. Indications: high blood pressure, paroxysmal supraventricular tachycardia  -     REFERRAL TO CARDIOLOGY    2. Type 2 diabetes mellitus with hyperglycemia, with long-term current use of insulin ([facility])  -     LIPID PANEL; Future  -     METABOLIC PANEL, COMPREHENSIVE; Future  -     MICROALBUMIN, UR, RAND W/ MICROALB/CREAT RATIO; Future  -     HEMOGLOBIN A1C WITH EAG; Future  -     AMB POC GLUCOSE BLOOD, BY GLUCOSE MONITORING DEVICE  -     insulin glargine (Lantus Solostar U-100 Insulin) 100 unit/mL (3 mL) inpn; Inject 20 units daily  -     dulaglutide (Trulicity) 7.43 EA/1.8 mL sub-q pen; 0.5 mL by SubCUTAneous route every seven (7) days. -     Insulin Needles, Disposable, (Comfort EZ Pen Needles) 32 gauge x 5/16\" ndle; 1 shot daily  -     Blood-Glucose Meter monitoring kit; Check BG bid IDDM E11.65 whichever insurance prefers  -     lancets misc; Check BG bid IDDM E11.65 whichever insurance prefers  -     glucose blood VI test strips (blood glucose test) strip; Check BG bid IDDM E11.65 whichever insurance prefers    3. Diabetic peripheral neuropathy associated with type 2 diabetes mellitus (HCC)  -     gabapentin (NEURONTIN) 100 mg capsule; Take 1 Capsule by mouth nightly. Max Daily Amount: 100 mg.     discussed with pt need to get good control of DM. She also still needs cataract surgery and advised need betetr control of DM till can be done. She is aware neuropathy may improve with better control of DM but it may not and may worsen overtime lana if she neglects the diabetes.   Follow-up and Dispositions · Return in 4 weeks (on 3/31/2022), or if symptoms worsen or fail to improve. I have discussed the diagnosis with the patient and the intended plan as seen in the above orders. The patient has received an after-visit summary and questions were answered concerning future plans. Pt conveyed understanding of plan.     Medication Side Effects and Warnings were discussed with patient    An electronic signature was used to authenticate this note  Jasmyn Trevino DO

## 2022-03-03 NOTE — PROGRESS NOTES
Chief Complaint   Patient presents with    Numbness     Patient presents in office today with c/o b/l foot numbness and pain  Also states that when she was in the hospital in December she was having episodes of her heart fluttering. She states that they hooked her up to a monitor and told her that she was having episodes as night but that they did not refer her to cardio. They sent her home with metoprolol and she took the 30 days. No other concerns. 1. Have you been to the ER, urgent care clinic since your last visit? Hospitalized since your last visit? No    2. Have you seen or consulted any other health care providers outside of the [address] [place] [address] since your last visit? Include any pap smears or colon screening.  No    Learning Assessment 6/20/2019   PRIMARY LEARNER Patient   BARRIERS PRIMARY LEARNER NONE   CO-LEARNER CAREGIVER -   PRIMARY LANGUAGE ENGLISH   LEARNER PREFERENCE PRIMARY LISTENING     PICTURES     VIDEOS   ANSWERED BY patient   RELATIONSHIP SELF

## 2022-03-03 NOTE — PATIENT INSTRUCTIONS
A Healthy Lifestyle: Care Instructions  Your Care Instructions     A healthy lifestyle can help you feel good, stay at a healthy weight, and have plenty of energy for both work and play. A healthy lifestyle is something you can share with your whole family. A healthy lifestyle also can lower your risk for serious health problems, such as high blood pressure, heart disease, and diabetes. You can follow a few steps listed below to improve your health and the health of your family. Follow-up care is a key part of your treatment and safety. Be sure to make and go to all appointments, and call your doctor if you are having problems. It's also a good idea to know your test results and keep a list of the medicines you take. How can you care for yourself at home? · Do not eat too much sugar, fat, or fast foods. You can still have dessert and treats now and then. The goal is moderation. · Start small to improve your eating habits. Pay attention to portion sizes, drink less juice and soda pop, and eat more fruits and vegetables. ? Eat a healthy amount of food. A 3-ounce serving of meat, for example, is about the size of a deck of cards. Fill the rest of your plate with vegetables and whole grains. ? Limit the amount of soda and sports drinks you have every day. Drink more water when you are thirsty. ? Eat plenty of fruits and vegetables every day. Have an apple or some carrot sticks as an afternoon snack instead of a candy bar. Try to have fruits and/or vegetables at every meal.  · Make exercise part of your daily routine. You may want to start with simple activities, such as walking, bicycling, or slow swimming. Try to be active 30 to 60 minutes every day. You do not need to do all 30 to 60 minutes all at once. For example, you can exercise 3 times a day for 10 or 20 minutes.  Moderate exercise is safe for most people, but it is always a good idea to talk to your doctor before starting an exercise program.  · Keep moving. [employer] the lawn, work in the garden, or [employer]. Take the stairs instead of the elevator at work. · If you smoke, quit. People who smoke have an increased risk for heart attack, stroke, cancer, and other lung illnesses. Quitting is hard, but there are ways to boost your chance of quitting tobacco for good. ? Use nicotine gum, patches, or lozenges. ? Ask your doctor about stop-smoking programs and medicines. ? Keep trying. In addition to reducing your risk of diseases in the future, you will notice some benefits soon after you stop using tobacco. If you have shortness of breath or asthma symptoms, they will likely get better within a few weeks after you quit. · Limit how much alcohol you drink. Moderate amounts of alcohol (up to 2 drinks a day for men, 1 drink a day for women) are okay. But drinking too much can lead to liver problems, high blood pressure, and other health problems. Family health  If you have a family, there are many things you can do together to improve your health. · Eat meals together as a family as often as possible. · Eat healthy foods. This includes fruits, vegetables, lean meats and dairy, and whole grains. · Include your family in your fitness plan. Most people think of activities such as jogging or tennis as the way to fitness, but there are many ways you and your family can be more active. Anything that makes you breathe hard and gets your heart pumping is exercise. Here are some tips:  ? Walk to do errands or to take your child to school or the bus.  ? Go for a family bike ride after dinner instead of watching TV. Where can you learn more? Go to http://www.gray.com/  Enter C590 in the search box to learn more about \"A Healthy Lifestyle: Care Instructions. \"  Current as of: June 16, 2021               Content Version: 13.0  © 2304-3072 Healthwise, Incorporated.    Care instructions adapted under license by [employer] (which disclaims liability or warranty for this information). If you have questions about a medical condition or this instruction, always ask your healthcare professional. [address] any warranty or liability for your use of this information.

## 2022-03-04 LAB
ALBUMIN SERPL-MCNC: 3.4 G/DL (ref 3.5–5)
ALBUMIN/GLOB SERPL: 0.8 {RATIO} (ref 1.1–2.2)
ALP SERPL-CCNC: 98 U/L (ref 45–117)
ALT SERPL-CCNC: 19 U/L (ref 12–78)
ANION GAP SERPL CALC-SCNC: 3 MMOL/L (ref 5–15)
AST SERPL-CCNC: 9 U/L (ref 15–37)
BILIRUB SERPL-MCNC: 0.7 MG/DL (ref 0.2–1)
BUN SERPL-MCNC: 10 MG/DL (ref 6–20)
BUN/CREAT SERPL: 12 (ref 12–20)
CALCIUM SERPL-MCNC: 9.2 MG/DL (ref 8.5–10.1)
CHLORIDE SERPL-SCNC: 102 MMOL/L (ref 97–108)
CHOLEST SERPL-MCNC: 141 MG/DL
CO2 SERPL-SCNC: 29 MMOL/L (ref 21–32)
CREAT SERPL-MCNC: 0.84 MG/DL (ref 0.55–1.02)
CREAT UR-MCNC: 196 MG/DL
EST. AVERAGE GLUCOSE BLD GHB EST-MCNC: 171 MG/DL
GLOBULIN SER CALC-MCNC: 4.2 G/DL (ref 2–4)
GLUCOSE SERPL-MCNC: 225 MG/DL (ref 65–100)
HBA1C MFR BLD: 7.6 % (ref 4–5.6)
HDLC SERPL-MCNC: 37 MG/DL
HDLC SERPL: 3.8 {RATIO} (ref 0–5)
LDLC SERPL CALC-MCNC: 94.6 MG/DL (ref 0–100)
MICROALBUMIN UR-MCNC: 7.41 MG/DL
MICROALBUMIN/CREAT UR-RTO: 38 MG/G (ref 0–30)
POTASSIUM SERPL-SCNC: 4.3 MMOL/L (ref 3.5–5.1)
PROT SERPL-MCNC: 7.6 G/DL (ref 6.4–8.2)
SODIUM SERPL-SCNC: 134 MMOL/L (ref 136–145)
TRIGL SERPL-MCNC: 47 MG/DL (ref ?–150)
VLDLC SERPL CALC-MCNC: 9.4 MG/DL

## 2022-03-07 NOTE — PROGRESS NOTES
2nd attempt. Called and spoke with patient in reference to labs. Patient verbalized understanding. She states that she has not picked up everything from the pharmacy yet. She states that after her apt they only had 2 prescriptions ready. She is going to contact the pharmacy today and let us know if there are any issues.

## 2022-03-15 ENCOUNTER — TELEPHONE (OUTPATIENT)
Dept: FAMILY MEDICINE CLINIC | Age: 47
End: 2022-03-15

## 2022-03-15 DIAGNOSIS — Z79.4 TYPE 2 DIABETES MELLITUS WITH HYPERGLYCEMIA, WITH LONG-TERM CURRENT USE OF INSULIN (HCC): ICD-10-CM

## 2022-03-15 DIAGNOSIS — E11.65 TYPE 2 DIABETES MELLITUS WITH HYPERGLYCEMIA, WITH LONG-TERM CURRENT USE OF INSULIN (HCC): ICD-10-CM

## 2022-03-15 RX ORDER — LANCETS
EACH MISCELLANEOUS
Qty: 100 EACH | Refills: 11 | Status: SHIPPED | OUTPATIENT
Start: 2022-03-15

## 2022-03-15 NOTE — TELEPHONE ENCOUNTER
Called and spoke with patient. She states that she received all testing supplies except the lancets. She states that the pharmacy did not get the RX. Can you resend?
Patient aware
Patient would like to speak to nurse about a medication she could not think of the name.  Patient's phone: [phone]
Resent
Patient is a 68yo female with PMH of hypertension, dyslipidemia, and osteoarthritis s/p L knee replacement who presented to the ED complaining of worsening shortness of breath. Patient was previously seen in the [facility] ED on 8/10/2020 and tested positive for COVID-19.    #Acute respiratory distress secondary to COVID-19 pneumonia with suspected cytokine release syndrome  - Sepsis not present on admission (HR 83, afebrile, no leukocytosis, tachypneic  - COVID-19 PCR 8/10/2020: positive  - Chest X-ray 8/15/2020: worsening bilateral infiltrates  - CT angio chest 8/15/2020: Multifocal bilateral patchy ground-glass and consolidative airspace opacities, consistent with viral pneumonia in the appropriate clinical setting. No evidence of acute pulmonary embolism.  - Follow repeat COVID-19 PCR  - Isolation precautions (contact, droplet, airborne)  - D-dimer: 3033 (elevated)  - LDH: 375 (elevated)  - Triglycerides: 128  - Follow procalcitonin, CRP, D-dimer  - Repeat procalcitonin and CRP Q48H  - No lymphopenia on admission or previous ED visit  - Patient currently saturating 95% on 2L O2 via nasal cannula   - Titrate supplemental O2 to maintain SpO2 92-96%  - ID consult pending for possible enrollment in remdesivir trial  - Pulmonary embolism ruled out on admission via CT angio, but will order venous doppler of bilateral lower extremities given elevated D-dimer  - Start Lovenox 90mg SQ Q12H  - Start methylprednisolone 60mg IV Q12H  - Start pantoprazole 40mg PO QD for GI prophylaxis while on steroids    #Hyperglycemia  - Elevated fingerstick 123 in ED  - Patient was on metformin 500mg PO BID earlier this year, but she claims it was for weight loss prior to her knee replacement  - Follow A1c in AM    #Chest pain, likely pleuritic vs costochondritis  - Troponin negative x1  - No acute ST changes on ECG  - No intervention at this time    #Hypertension  - Continue with amlodipine 5mg PO QD  - Convert home medication chlorthalidone to hydrochlorothiazide 25mg PO QD  - Monitor blood pressure    #Dyslipidemia  - Continue with atorvastatin 80mg PO QHS and omega-3 2mg PO QD  - Follow lipid profile in AM    #Morbid obesity  - Counseled patient on weight loss and lifestyle modifications  - Possible candidate for bariatric surgery     #Misc  - DVT Prophylaxis: Lovenox 90mg SQ Q12H   - GI Prophylaxis: pantoprazole 40mg PO QD   - Diet: DASH/TLC  - Activity: ambulate with walker  - IV Fluids: not indicated  - Code Status: Full Code    Dispo: admit to medicine

## 2022-03-18 PROBLEM — L02.91 ABSCESS: Status: ACTIVE | Noted: 2021-12-17

## 2022-03-19 PROBLEM — L03.90 CELLULITIS: Status: ACTIVE | Noted: 2021-12-17

## 2022-03-19 PROBLEM — N73.9 ABSCESS OF FEMALE PELVIS: Status: ACTIVE | Noted: 2021-12-18

## 2022-03-20 PROBLEM — A41.9 SEPSIS (HCC): Status: ACTIVE | Noted: 2021-12-17

## 2022-03-20 PROBLEM — E66.01 OBESITY, MORBID (HCC): Status: ACTIVE | Noted: 2018-05-02

## 2022-04-11 ENCOUNTER — OFFICE VISIT (OUTPATIENT)
Dept: FAMILY MEDICINE CLINIC | Age: 47
End: 2022-04-11
Payer: COMMERCIAL

## 2022-04-11 VITALS
SYSTOLIC BLOOD PRESSURE: 160 MMHG | WEIGHT: 289 LBS | BODY MASS INDEX: 45.36 KG/M2 | DIASTOLIC BLOOD PRESSURE: 94 MMHG | OXYGEN SATURATION: 97 % | TEMPERATURE: 98.3 F | HEART RATE: 79 BPM | HEIGHT: 67 IN | RESPIRATION RATE: 16 BRPM

## 2022-04-11 DIAGNOSIS — Z79.4 TYPE 2 DIABETES MELLITUS WITH HYPERGLYCEMIA, WITH LONG-TERM CURRENT USE OF INSULIN (HCC): ICD-10-CM

## 2022-04-11 DIAGNOSIS — E11.65 TYPE 2 DIABETES MELLITUS WITH HYPERGLYCEMIA, WITH LONG-TERM CURRENT USE OF INSULIN (HCC): ICD-10-CM

## 2022-04-11 PROCEDURE — 3051F HG A1C>EQUAL 7.0%<8.0%: CPT | Performed by: FAMILY MEDICINE

## 2022-04-11 PROCEDURE — 99213 OFFICE O/P EST LOW 20 MIN: CPT | Performed by: FAMILY MEDICINE

## 2022-04-11 RX ORDER — ALCOHOL ANTISEPTIC PADS
PADS, MEDICATED (EA) TOPICAL
Qty: 30 EACH | Refills: 11 | Status: SHIPPED | OUTPATIENT
Start: 2022-04-11

## 2022-04-11 RX ORDER — DULAGLUTIDE 1.5 MG/.5ML
1.5 INJECTION, SOLUTION SUBCUTANEOUS
Qty: 4 EACH | Refills: 5 | Status: SHIPPED | OUTPATIENT
Start: 2022-04-11

## 2022-04-11 RX ORDER — INSULIN GLARGINE 100 [IU]/ML
INJECTION, SOLUTION SUBCUTANEOUS
Qty: 2 PEN | Refills: 3 | Status: SHIPPED | OUTPATIENT
Start: 2022-04-11

## 2022-04-11 NOTE — PROGRESS NOTES
Chief Complaint   Patient presents with    Follow-up     Patient presents in office today for 1 month f/u. States that she was unable to  the lantus as it was no covered by insurance. She did not contact insurance to find out what the preferred brand in. States that her sugars have been running upper 100-200s. No other concerns. 1. Have you been to the ER, urgent care clinic since your last visit? Hospitalized since your last visit? No    2. Have you seen or consulted any other health care providers outside of the [address] [place] [address] since your last visit? Include any pap smears or colon screening.  No    Learning Assessment 6/20/2019   PRIMARY LEARNER Patient   BARRIERS PRIMARY LEARNER NONE   CO-LEARNER CAREGIVER -   PRIMARY LANGUAGE ENGLISH   LEARNER PREFERENCE PRIMARY LISTENING     PICTURES     VIDEOS   ANSWERED BY patient   RELATIONSHIP SELF Melolabial Interpolation Flap Text: A decision was made to reconstruct the defect utilizing an interpolation axial flap and a staged reconstruction.  A telfa template was made of the defect.  This telfa template was then used to outline the melolabial interpolation flap.  The donor area for the pedicle flap was then injected with anesthesia.  The flap was excised through the skin and subcutaneous tissue down to the layer of the underlying musculature.  The pedicle flap was carefully excised within this deep plane to maintain its blood supply.  The edges of the donor site were undermined.   The donor site was closed in a primary fashion.  The pedicle was then rotated into position and sutured.  Once the tube was sutured into place, adequate blood supply was confirmed with blanching and refill.  The pedicle was then wrapped with xeroform gauze and dressed appropriately with a telfa and gauze bandage to ensure continued blood supply and protect the attached pedicle.

## 2022-04-11 NOTE — PATIENT INSTRUCTIONS
A Healthy Lifestyle: Care Instructions  Your Care Instructions     A healthy lifestyle can help you feel good, stay at a healthy weight, and have plenty of energy for both work and play. A healthy lifestyle is something you can share with your whole family. A healthy lifestyle also can lower your risk for serious health problems, such as high blood pressure, heart disease, and diabetes. You can follow a few steps listed below to improve your health and the health of your family. Follow-up care is a key part of your treatment and safety. Be sure to make and go to all appointments, and call your doctor if you are having problems. It's also a good idea to know your test results and keep a list of the medicines you take. How can you care for yourself at home? · Do not eat too much sugar, fat, or fast foods. You can still have dessert and treats now and then. The goal is moderation. · Start small to improve your eating habits. Pay attention to portion sizes, drink less juice and soda pop, and eat more fruits and vegetables. ? Eat a healthy amount of food. A 3-ounce serving of meat, for example, is about the size of a deck of cards. Fill the rest of your plate with vegetables and whole grains. ? Limit the amount of soda and sports drinks you have every day. Drink more water when you are thirsty. ? Eat plenty of fruits and vegetables every day. Have an apple or some carrot sticks as an afternoon snack instead of a candy bar. Try to have fruits and/or vegetables at every meal.  · Make exercise part of your daily routine. You may want to start with simple activities, such as walking, bicycling, or slow swimming. Try to be active 30 to 60 minutes every day. You do not need to do all 30 to 60 minutes all at once. For example, you can exercise 3 times a day for 10 or 20 minutes.  Moderate exercise is safe for most people, but it is always a good idea to talk to your doctor before starting an exercise program.  · Keep moving. [employer] the lawn, work in the garden, or [employer]. Take the stairs instead of the elevator at work. · If you smoke, quit. People who smoke have an increased risk for heart attack, stroke, cancer, and other lung illnesses. Quitting is hard, but there are ways to boost your chance of quitting tobacco for good. ? Use nicotine gum, patches, or lozenges. ? Ask your doctor about stop-smoking programs and medicines. ? Keep trying. In addition to reducing your risk of diseases in the future, you will notice some benefits soon after you stop using tobacco. If you have shortness of breath or asthma symptoms, they will likely get better within a few weeks after you quit. · Limit how much alcohol you drink. Moderate amounts of alcohol (up to 2 drinks a day for men, 1 drink a day for women) are okay. But drinking too much can lead to liver problems, high blood pressure, and other health problems. Family health  If you have a family, there are many things you can do together to improve your health. · Eat meals together as a family as often as possible. · Eat healthy foods. This includes fruits, vegetables, lean meats and dairy, and whole grains. · Include your family in your fitness plan. Most people think of activities such as jogging or tennis as the way to fitness, but there are many ways you and your family can be more active. Anything that makes you breathe hard and gets your heart pumping is exercise. Here are some tips:  ? Walk to do errands or to take your child to school or the bus.  ? Go for a family bike ride after dinner instead of watching TV. Where can you learn more? Go to http://www.gray.com/  Enter H328 in the search box to learn more about \"A Healthy Lifestyle: Care Instructions. \"  Current as of: June 16, 2021               Content Version: 13.2  © 6749-7338 Healthwise, Incorporated.    Care instructions adapted under license by [employer] (which disclaims liability or warranty for this information). If you have questions about a medical condition or this instruction, always ask your healthcare professional. [address] any warranty or liability for your use of this information.

## 2022-04-11 NOTE — PROGRESS NOTES
Progress Note    she is a 51y.o. year old female who presents for evalution. Subjective:     Pt here for f/u on her glucose and states has been running 100's-mid 200's will increase the trulicity which she tolerates. A1C was 7.6 so better overall. She is needing cataract surgery and advised she can go see optho about this. Reviewed PmHx, RxHx, FmHx, SocHx, AllgHx and updated and dated in the chart. Review of Systems - negative except as listed above in the HPI    Objective:     Vitals:    04/11/22 0804 04/11/22 0839   BP: (!) 152/72 (!) 160/94   Pulse: 79    Resp: 16    Temp: 98.3 °F (36.8 °C)    TempSrc: Oral    SpO2: 97%    Weight: 289 lb (131.1 kg)    Height: 5' 7\" (1.702 m)        Current Outpatient Medications   Medication Sig    dulaglutide (Trulicity) 1.5 KH/3.1 mL sub-q pen 0.5 mL by SubCUTAneous route every seven (7) days.  insulin glargine (Basaglar KwikPen U-100 Insulin) 100 unit/mL (3 mL) inpn 10units subq qhs this will be titrated thru out the month    Insulin Needles, Disposable, (Comfort EZ Pen Needles) 32 gauge x 5/16\" ndle 1 shot daily    lancets misc Check BG bid IDDM E11.65 whichever insurance prefers    metoprolol tartrate (LOPRESSOR) 25 mg tablet Take 1 Tablet by mouth two (2) times a day. Indications: high blood pressure, paroxysmal supraventricular tachycardia    gabapentin (NEURONTIN) 100 mg capsule Take 1 Capsule by mouth nightly. Max Daily Amount: 100 mg.  Blood-Glucose Meter monitoring kit Check BG bid IDDM E11.65 whichever insurance prefers    glucose blood VI test strips (blood glucose test) strip Check BG bid IDDM E11.65 whichever insurance prefers     No current facility-administered medications for this visit. Physical Examination: General appearance - alert, well appearing, and in no distress  Mental status - alert, oriented to person, place, and time      Assessment/ Plan:   Diagnoses and all orders for this visit:    1.  Type 2 diabetes mellitus with hyperglycemia, with long-term current use of insulin (HCC)  -     dulaglutide (Trulicity) 1.5 FH/3.2 mL sub-q pen; 0.5 mL by SubCUTAneous route every seven (7) days. -     insulin glargine (Basaglar KwikPen U-100 Insulin) 100 unit/mL (3 mL) inpn; 10units subq qhs this will be titrated thru out the month  -     Insulin Needles, Disposable, (Comfort EZ Pen Needles) 32 gauge x 5/16\" ndle; 1 shot daily       Follow-up and Dispositions    · Return in about 2 months (around 6/11/2022), or if symptoms worsen or fail to improve. I have discussed the diagnosis with the patient and the intended plan as seen in the above orders. The patient has received an after-visit summary and questions were answered concerning future plans. Pt conveyed understanding of plan.     Medication Side Effects and Warnings were discussed with patient    An electronic signature was used to authenticate this note  Renetta Villasenor, DO

## 2022-04-20 ENCOUNTER — OFFICE VISIT (OUTPATIENT)
Dept: CARDIOLOGY CLINIC | Age: 47
End: 2022-04-20
Payer: COMMERCIAL

## 2022-04-20 VITALS
BODY MASS INDEX: 45.2 KG/M2 | HEART RATE: 79 BPM | DIASTOLIC BLOOD PRESSURE: 80 MMHG | WEIGHT: 288 LBS | HEIGHT: 67 IN | OXYGEN SATURATION: 98 % | SYSTOLIC BLOOD PRESSURE: 130 MMHG

## 2022-04-20 DIAGNOSIS — I10 ESSENTIAL HYPERTENSION: ICD-10-CM

## 2022-04-20 DIAGNOSIS — I47.29 NSVT (NONSUSTAINED VENTRICULAR TACHYCARDIA): Primary | ICD-10-CM

## 2022-04-20 DIAGNOSIS — R07.9 CHEST PAIN, UNSPECIFIED TYPE: ICD-10-CM

## 2022-04-20 DIAGNOSIS — E78.5 DYSLIPIDEMIA: ICD-10-CM

## 2022-04-20 PROCEDURE — 99204 OFFICE O/P NEW MOD 45 MIN: CPT | Performed by: SPECIALIST

## 2022-04-20 PROCEDURE — 93000 ELECTROCARDIOGRAM COMPLETE: CPT | Performed by: SPECIALIST

## 2022-04-20 RX ORDER — ROSUVASTATIN CALCIUM 10 MG/1
10 TABLET, COATED ORAL
Qty: 90 TABLET | Refills: 3 | Status: SHIPPED | OUTPATIENT
Start: 2022-04-20

## 2022-04-20 NOTE — PROGRESS NOTES
Orders for 2 day exercise cardiolite and echo when possible. See me in 6 months    per Dr. Theresa Herrera VO.   Dx: cp, nsvt

## 2022-04-20 NOTE — PROGRESS NOTES
Chief Complaint   Patient presents with    New Patient     Ref. Dr. Gino Cronin    SVT     Visit Vitals  /80 (BP 1 Location: Left upper arm, BP Patient Position: Sitting)   Pulse 79   Ht 5' 7\" (1.702 m)   Wt 288 lb (130.6 kg)   SpO2 98%   BMI 45.11 kg/m²     Chest pain sometimes lying down pt can feel pain. SOB denied   Palpitations YES. Not frequently. Swelling in hands/feet denied   Dizziness, in the morning, and bending down.    Recent hospital stays denied     Refills denied

## 2022-04-20 NOTE — PROGRESS NOTES
Preston Gomez MD. [facility]              Patient: Ellis Minor  : 1975      Today's Date: 2022          HISTORY OF PRESENT ILLNESS:     History of Present Illness:  Referred for SVT     Dr. Brenden Gonzalez noted \"Had SVT in hospital after zosyn was given toprol and finished this. She states she feels like it come son at times at night, no CP no dizzy spells. Apparently runs in family per pt. Sister and mom have it too. \"    At [facility] she had SVT during Dec 2021 hospital stay - 10 min ---> started metoprolol afterwards. She say she started having fluttering after COVID vaccine --- does not have much fluttering anymore. Has sharp, random chest pain at times. Denies heart racing. PAST MEDICAL HISTORY:     Past Medical History:   Diagnosis Date    Anxiety     Depression     Diabetes ([address]     Hypertension     Peripheral neuropathy     Sciatica of left side     Sleep apnea        Past Surgical History:   Procedure Laterality Date    HX BREAST REDUCTION      HX CHOLECYSTECTOMY      HX TUBAL LIGATION           MEDICATIONS:     Current Outpatient Medications   Medication Sig Dispense Refill    dulaglutide (Trulicity) 1.5 RA/9.5 mL sub-q pen 0.5 mL by SubCUTAneous route every seven (7) days. 4 Each 5    insulin glargine (Basaglar KwikPen U-100 Insulin) 100 unit/mL (3 mL) inpn 10units subq qhs this will be titrated thru out the month 2 Pen 3    Insulin Needles, Disposable, (Comfort EZ Pen Needles) 32 gauge x 5/16\" ndle 1 shot daily 30 Each 11    lancets misc Check BG bid IDDM E11.65 whichever insurance prefers 100 Each 11    metoprolol tartrate (LOPRESSOR) 25 mg tablet Take 1 Tablet by mouth two (2) times a day. Indications: high blood pressure, paroxysmal supraventricular tachycardia 60 Tablet 1    gabapentin (NEURONTIN) 100 mg capsule Take 1 Capsule by mouth nightly.  Max Daily Amount: 100 mg. 30 Capsule 1    Blood-Glucose Meter monitoring kit Check BG bid IDDM E11.65 whichever insurance prefers 1 Kit 0    glucose blood VI test strips (blood glucose test) strip Check BG bid IDDM E11.65 whichever insurance prefers 100 Strip 11       No Known Allergies        SOCIAL HISTORY:     Social History     Tobacco Use    Smoking status: Never Smoker    Smokeless tobacco: Never Used   Substance Use Topics    Alcohol use: No     Comment: rarely    Drug use: No         FAMILY HISTORY:     Family History   Problem Relation Age of Onset    Heart Disease Mother     Hypertension Mother     Stroke Mother     Heart Attack Mother     Diabetes Mother     Diabetes Father     Cancer Maternal Aunt         breast cancer           REVIEW OF SYMPTOMS:     Review of Symptoms:  Constitutional: Negative for fever, chills  HEENT: Negative for nosebleeds, tinnitus, and vision changes. Respiratory: Negative for cough, wheezing  Cardiovascular: Negative for orthopnea, claudication, syncope, and PND. Gastrointestinal: Negative for abdominal pain, diarrhea, melena. Genitourinary: Negative for dysuria  Musculoskeletal: Negative for myalgias. Skin: Negative for rash  Heme: No problems bleeding. Neurological: Negative for speech change and focal weakness. PHYSICAL EXAM:     Physical Exam:  Visit Vitals  /80 (BP 1 Location: Left upper arm, BP Patient Position: Sitting)   Pulse 79   Ht 5' 7\" (1.702 m)   Wt 288 lb (130.6 kg)   SpO2 98%   BMI 45.11 kg/m²     Patient appears generally well, mood and affect are appropriate and pleasant. HEENT:  Hearing intact, non-icteric, normocephalic, atraumatic. Neck Exam: Supple, No JVD or carotid bruits. Lung Exam: Clear to auscultation, even breath sounds. Cardiac Exam: Regular rate and rhythm with no murmur or rub  Abdomen: Soft, non-tender, normal bowel sounds. obese  Extremities: Moves all ext well. No lower extremity edema.   MSKTL: Overall good ROM ext  Skin: No significant rashes  Psych: Appropriate affect  Neuro - Grossly intact      LABS / OTHER STUDIES reviewed:       Lab Results   Component Value Date/Time    Sodium 134 (L) 03/03/2022 11:08 AM    Potassium 4.3 03/03/2022 11:08 AM    Chloride 102 03/03/2022 11:08 AM    CO2 29 03/03/2022 11:08 AM    Anion gap 3 (L) 03/03/2022 11:08 AM    Glucose 225 (H) 03/03/2022 11:08 AM    BUN 10 03/03/2022 11:08 AM    Creatinine 0.84 03/03/2022 11:08 AM    BUN/Creatinine ratio 12 03/03/2022 11:08 AM    GFR est AA >60 03/03/2022 11:08 AM    GFR est non-AA >60 03/03/2022 11:08 AM    Calcium 9.2 03/03/2022 11:08 AM    Bilirubin, total 0.7 03/03/2022 11:08 AM    Alk. phosphatase 98 03/03/2022 11:08 AM    Protein, total 7.6 03/03/2022 11:08 AM    Albumin 3.4 (L) 03/03/2022 11:08 AM    Globulin 4.2 (H) 03/03/2022 11:08 AM    A-G Ratio 0.8 (L) 03/03/2022 11:08 AM    ALT (SGPT) 19 03/03/2022 11:08 AM    AST (SGOT) 9 (L) 03/03/2022 11:08 AM     Lab Results   Component Value Date/Time    WBC 11.0 12/20/2021 02:37 AM    HGB 9.6 (L) 12/20/2021 02:37 AM    HCT 28.5 (L) 12/20/2021 02:37 AM    PLATELET 470 98/57/6378 02:37 AM    MCV 88.2 12/20/2021 02:37 AM     Lab Results   Component Value Date/Time    Hemoglobin A1c 7.6 (H) 03/03/2022 11:08 AM    Hemoglobin A1c (POC) 9.8 12/13/2019 09:27 AM       Lab Results   Component Value Date/Time    TSH 2.870 05/02/2018 10:57 AM     Lab Results   Component Value Date/Time    Cholesterol, total 141 03/03/2022 11:08 AM    HDL Cholesterol 37 03/03/2022 11:08 AM    LDL, calculated 94.6 03/03/2022 11:08 AM    VLDL, calculated 9.4 03/03/2022 11:08 AM    Triglyceride 47 03/03/2022 11:08 AM    CHOL/HDL Ratio 3.8 03/03/2022 11:08 AM           CARDIAC DIAGNOSTICS:     Cardiac Evaluation Includes:  I reviewed the results below. EKG 12/18/21 - suspect lead misplacement, sinus, inferior infarct -   EKG 4/20/22 - NSR, normal   I independently viewed and interpreted the test image(s) myself.     Tele 12/22/21 ---> 9 beat NSVT and Regular SVT at 164 bpm on scanned strips         ASSESSMENT AND PLAN: Assessment and Plan:    1) Atypical CP and 8 beat run NSVT (during 12/21 hospital stay)   - chest pain atypical for angina   - Check an echo   - Check an exercise cardiolite     2) SVT (regular rhythm)   - had a (reportedly 10 min) run during 12/21 hospital stay ---> was started on metoprolol on discharge   - She has been doing OK on metoprolol without heart racing spells   - Can consider an event monitor later if she has heart racing complaints   - cont metoprolol     3) HTN  - BP looks OK   - Cont meds     4) CV risk / Dyslipidemia (low HDL)   - She has DM  - The 10-year ASCVD risk score (Nannette Xiong, et al., 2013) is: 9.5%  - Will start crestor 10 mg daily for primary prevention ---> recheck labs in a month     5) Phone FU. See me in 6 months      with 6 kids. Has a [employer] business. Ryan Guzman MD, [address]  Ph: [phone]    [phone]      Total time (preparing to see the patient, reviewing data, seeing patient face to fine, counseling patient, documenting information, etc) was  60  min. ADDENDUM   5/19/2022     Echo 5/18/22 - TDS. LVEF 56%    Exercise Cardiolite 5/18/22 - walked 6:22 (8 METS), normal MPI. LVEF 50%    Will send a message of normal test results.

## 2022-05-17 ENCOUNTER — TELEPHONE (OUTPATIENT)
Dept: CARDIOLOGY CLINIC | Age: 47
End: 2022-05-17

## 2022-05-17 NOTE — TELEPHONE ENCOUNTER
ID verified per protocol.  Patient confirmed for nuclear stress test appointment reminder including date, time, location, prep, and duration of test. Patient verbalized understanding and agrees with prep/test.

## 2022-05-18 ENCOUNTER — ANCILLARY PROCEDURE (OUTPATIENT)
Dept: CARDIOLOGY CLINIC | Age: 47
End: 2022-05-18
Payer: COMMERCIAL

## 2022-05-18 ENCOUNTER — ANCILLARY PROCEDURE (OUTPATIENT)
Dept: CARDIOLOGY CLINIC | Age: 47
End: 2022-05-18

## 2022-05-18 VITALS — HEIGHT: 67 IN | WEIGHT: 288 LBS | BODY MASS INDEX: 45.2 KG/M2

## 2022-05-18 VITALS — WEIGHT: 288 LBS | BODY MASS INDEX: 45.2 KG/M2 | HEIGHT: 67 IN

## 2022-05-18 DIAGNOSIS — I10 ESSENTIAL HYPERTENSION: ICD-10-CM

## 2022-05-18 DIAGNOSIS — R07.9 CHEST PAIN, UNSPECIFIED TYPE: ICD-10-CM

## 2022-05-18 DIAGNOSIS — I47.29 NSVT (NONSUSTAINED VENTRICULAR TACHYCARDIA): ICD-10-CM

## 2022-05-18 DIAGNOSIS — E78.5 DYSLIPIDEMIA: ICD-10-CM

## 2022-05-18 LAB
ECHO AO ROOT DIAM: 3.2 CM
ECHO AO ROOT INDEX: 1.36 CM/M2
ECHO AV MEAN GRADIENT: 4 MMHG
ECHO AV MEAN VELOCITY: 0.9 M/S
ECHO AV PEAK GRADIENT: 7 MMHG
ECHO AV PEAK VELOCITY: 1.3 M/S
ECHO AV VELOCITY RATIO: 0.69
ECHO AV VTI: 25.4 CM
ECHO LA DIAMETER INDEX: 1.53 CM/M2
ECHO LA DIAMETER: 3.6 CM
ECHO LA TO AORTIC ROOT RATIO: 1.13
ECHO LV E' LATERAL VELOCITY: 8 CM/S
ECHO LV E' SEPTAL VELOCITY: 7 CM/S
ECHO LV EDV A2C: 56 ML
ECHO LV EDV A4C: 65 ML
ECHO LV EDV BP: 62 ML (ref 56–104)
ECHO LV EDV INDEX A4C: 28 ML/M2
ECHO LV EDV INDEX BP: 26 ML/M2
ECHO LV EDV NDEX A2C: 24 ML/M2
ECHO LV EJECTION FRACTION A2C: 49 %
ECHO LV EJECTION FRACTION A4C: 60 %
ECHO LV EJECTION FRACTION BIPLANE: 56 % (ref 55–100)
ECHO LV ESV A2C: 28 ML
ECHO LV ESV A4C: 26 ML
ECHO LV ESV BP: 27 ML (ref 19–49)
ECHO LV ESV INDEX A2C: 12 ML/M2
ECHO LV ESV INDEX A4C: 11 ML/M2
ECHO LV ESV INDEX BP: 11 ML/M2
ECHO LV FRACTIONAL SHORTENING: 30 % (ref 28–44)
ECHO LV INTERNAL DIMENSION DIASTOLE INDEX: 2.12 CM/M2
ECHO LV INTERNAL DIMENSION DIASTOLIC: 5 CM (ref 3.9–5.3)
ECHO LV INTERNAL DIMENSION SYSTOLIC INDEX: 1.48 CM/M2
ECHO LV INTERNAL DIMENSION SYSTOLIC: 3.5 CM
ECHO LV IVSD: 1 CM (ref 0.6–0.9)
ECHO LV MASS 2D: 194.4 G (ref 67–162)
ECHO LV MASS INDEX 2D: 82.4 G/M2 (ref 43–95)
ECHO LV POSTERIOR WALL DIASTOLIC: 1.1 CM (ref 0.6–0.9)
ECHO LV RELATIVE WALL THICKNESS RATIO: 0.44
ECHO LVOT AV VTI INDEX: 0.78
ECHO LVOT MEAN GRADIENT: 2 MMHG
ECHO LVOT PEAK GRADIENT: 3 MMHG
ECHO LVOT PEAK VELOCITY: 0.9 M/S
ECHO LVOT VTI: 19.7 CM
ECHO RV FREE WALL PEAK S': 16 CM/S
ECHO RV TAPSE: 2.1 CM (ref 1.7–?)

## 2022-05-18 PROCEDURE — A9500 TC99M SESTAMIBI: HCPCS | Performed by: SPECIALIST

## 2022-05-18 PROCEDURE — 78451 HT MUSCLE IMAGE SPECT SING: CPT | Performed by: SPECIALIST

## 2022-05-18 PROCEDURE — 93306 TTE W/DOPPLER COMPLETE: CPT | Performed by: SPECIALIST

## 2022-05-18 RX ORDER — TETRAKIS(2-METHOXYISOBUTYLISOCYANIDE)COPPER(I) TETRAFLUOROBORATE 1 MG/ML
40 INJECTION, POWDER, LYOPHILIZED, FOR SOLUTION INTRAVENOUS ONCE
Status: COMPLETED | OUTPATIENT
Start: 2022-05-18 | End: 2022-05-18

## 2022-05-18 RX ADMIN — TETRAKIS(2-METHOXYISOBUTYLISOCYANIDE)COPPER(I) TETRAFLUOROBORATE 26.4 MILLICURIE: 1 INJECTION, POWDER, LYOPHILIZED, FOR SOLUTION INTRAVENOUS at 12:30

## 2022-05-19 LAB
NUC STRESS EJECTION FRACTION: 50 %
STRESS ANGINA INDEX: 0
STRESS BASELINE DIAS BP: 82 MMHG
STRESS BASELINE HR: 105 BPM
STRESS BASELINE SYS BP: 134 MMHG
STRESS ESTIMATED WORKLOAD: 8 METS
STRESS EXERCISE DUR MIN: 6 MIN
STRESS EXERCISE DUR SEC: 22 SEC
STRESS O2 SAT PEAK: 97 %
STRESS O2 SAT REST: 98 %
STRESS PEAK DIAS BP: 90 MMHG
STRESS PEAK SYS BP: 180 MMHG
STRESS PERCENT HR ACHIEVED: 98 %
STRESS POST PEAK HR: 169 BPM
STRESS RATE PRESSURE PRODUCT: NORMAL BPM*MMHG
STRESS SR DUKE TREADMILL SCORE: 6
STRESS ST DEPRESSION: 0 MM
STRESS TARGET HR: 172 BPM

## 2022-05-23 ENCOUNTER — TELEPHONE (OUTPATIENT)
Dept: CARDIOLOGY CLINIC | Age: 47
End: 2022-05-23

## 2022-05-23 NOTE — TELEPHONE ENCOUNTER
Per Dr. Dariel Wilkinson: Merline Lamb you please let her know that echo and stress test show normal findings. Thanks!\"        Spoke with Pt and gave her above msg per Dr. Elvira Sanders.

## 2022-05-24 DIAGNOSIS — E11.42 DIABETIC PERIPHERAL NEUROPATHY ASSOCIATED WITH TYPE 2 DIABETES MELLITUS (HCC): ICD-10-CM

## 2022-05-24 DIAGNOSIS — I47.1 SVT (SUPRAVENTRICULAR TACHYCARDIA) (HCC): ICD-10-CM

## 2022-05-26 RX ORDER — METOPROLOL TARTRATE 25 MG/1
TABLET, FILM COATED ORAL
Qty: 60 TABLET | Refills: 2 | Status: SHIPPED | OUTPATIENT
Start: 2022-05-26

## 2022-05-26 RX ORDER — GABAPENTIN 100 MG/1
CAPSULE ORAL
Qty: 30 CAPSULE | Refills: 2 | Status: SHIPPED | OUTPATIENT
Start: 2022-05-26

## 2022-06-29 ENCOUNTER — DOCUMENTATION ONLY (OUTPATIENT)
Dept: FAMILY MEDICINE CLINIC | Age: 47
End: 2022-06-29

## 2022-06-29 ENCOUNTER — OFFICE VISIT (OUTPATIENT)
Dept: FAMILY MEDICINE CLINIC | Age: 47
End: 2022-06-29
Payer: COMMERCIAL

## 2022-06-29 VITALS
RESPIRATION RATE: 16 BRPM | OXYGEN SATURATION: 100 % | HEART RATE: 88 BPM | TEMPERATURE: 98.1 F | SYSTOLIC BLOOD PRESSURE: 166 MMHG | DIASTOLIC BLOOD PRESSURE: 78 MMHG | WEIGHT: 293 LBS | HEIGHT: 67 IN | BODY MASS INDEX: 45.99 KG/M2

## 2022-06-29 DIAGNOSIS — H25.9 AGE-RELATED CATARACT OF BOTH EYES, UNSPECIFIED AGE-RELATED CATARACT TYPE: Primary | ICD-10-CM

## 2022-06-29 DIAGNOSIS — I10 ESSENTIAL HYPERTENSION: ICD-10-CM

## 2022-06-29 PROCEDURE — 99213 OFFICE O/P EST LOW 20 MIN: CPT | Performed by: FAMILY MEDICINE

## 2022-06-29 NOTE — PROGRESS NOTES
Completed Pre Op form faxed to [facility]. [phone]. Confirmation received.

## 2022-06-29 NOTE — PROGRESS NOTES
Chief Complaint   Patient presents with    Pre-op Exam     [facility]: Cataract surgery     1. Have you been to the ER, urgent care clinic since your last visit? Hospitalized since your last visit? No    2. Have you seen or consulted any other health care providers outside of the [address] [place] [address] since your last visit? Include any pap smears or colon screening. No         Chief Complaint   Patient presents with    Pre-op Exam     [facility]: Cataract surgery     She is a 49 y.o. female who presents for evalution. Reviewed PmHx, RxHx, FmHx, SocHx, AllgHx and updated and dated in the chart. Patient Active Problem List    Diagnosis    Morbid obesity [facility]    Abscess of female pelvis    Abscess    Cellulitis    Sepsis ([facility]    Obesity, morbid ([facility]    Type 2 diabetes mellitus without complication ([facility]    Essential hypertension       Review of Systems - negative except as listed above in the HPI    Objective:     Vitals:    06/29/22 1132   BP: (!) 166/78   Pulse: 88   Resp: 16   Temp: 98.1 °F (36.7 °C)   TempSrc: Oral   SpO2: 100%   Weight: 294 lb (133.4 kg)   Height: 5' 7\" (1.702 m)         Assessment/ Plan:   Diagnoses and all orders for this visit:    1. Age-related cataract of both eyes, unspecified age-related cataract type  -form filled and faxed    2. Essential hypertension  -ok at home--pt to cont to check           I have discussed the diagnosis with the patient and the intended plan as seen in the above orders. The patient understands and agrees with the plan. The patient has received an after-visit summary and questions were answered concerning future plans. Medication Side Effects and Warnings were discussed with patient  Patient Labs were reviewed and or requested:  Patient Past Records were reviewed and or requested    Omar Spencer M.D. There are no Patient Instructions on file for this visit.

## 2023-03-01 DIAGNOSIS — E11.65 TYPE 2 DIABETES MELLITUS WITH HYPERGLYCEMIA, WITH LONG-TERM CURRENT USE OF INSULIN (HCC): ICD-10-CM

## 2023-03-01 DIAGNOSIS — Z79.4 TYPE 2 DIABETES MELLITUS WITH HYPERGLYCEMIA, WITH LONG-TERM CURRENT USE OF INSULIN (HCC): ICD-10-CM

## 2023-03-01 RX ORDER — DULAGLUTIDE 1.5 MG/.5ML
INJECTION, SOLUTION SUBCUTANEOUS
Qty: 4 ML | Refills: 0 | Status: SHIPPED | OUTPATIENT
Start: 2023-03-01

## 2023-03-06 DIAGNOSIS — Z79.4 TYPE 2 DIABETES MELLITUS WITH HYPERGLYCEMIA, WITH LONG-TERM CURRENT USE OF INSULIN (HCC): ICD-10-CM

## 2023-03-06 DIAGNOSIS — E11.65 TYPE 2 DIABETES MELLITUS WITH HYPERGLYCEMIA, WITH LONG-TERM CURRENT USE OF INSULIN (HCC): ICD-10-CM

## 2023-03-07 RX ORDER — INSULIN GLARGINE 100 [IU]/ML
INJECTION, SOLUTION SUBCUTANEOUS
Qty: 15 ML | Refills: 0 | Status: SHIPPED | OUTPATIENT
Start: 2023-03-07

## 2023-03-07 NOTE — TELEPHONE ENCOUNTER
Called and spoke with patient. Advised that she needs to be seen in order to get further refills. Patient verbalized understanding.  Apt scheduled for 3/24/23

## 2023-03-07 NOTE — TELEPHONE ENCOUNTER
Diabetes has not been checked in quite some time.   This will not be refilled again without a follow-up appointment

## 2023-03-24 ENCOUNTER — OFFICE VISIT (OUTPATIENT)
Dept: FAMILY MEDICINE CLINIC | Age: 48
End: 2023-03-24

## 2023-03-24 VITALS
HEART RATE: 95 BPM | DIASTOLIC BLOOD PRESSURE: 89 MMHG | BODY MASS INDEX: 45.99 KG/M2 | RESPIRATION RATE: 18 BRPM | WEIGHT: 293 LBS | HEIGHT: 67 IN | OXYGEN SATURATION: 96 % | SYSTOLIC BLOOD PRESSURE: 152 MMHG | TEMPERATURE: 98.3 F

## 2023-03-24 DIAGNOSIS — E11.65 TYPE 2 DIABETES MELLITUS WITH HYPERGLYCEMIA, WITH LONG-TERM CURRENT USE OF INSULIN (HCC): ICD-10-CM

## 2023-03-24 DIAGNOSIS — Z79.4 TYPE 2 DIABETES MELLITUS WITH HYPERGLYCEMIA, WITH LONG-TERM CURRENT USE OF INSULIN (HCC): ICD-10-CM

## 2023-03-24 DIAGNOSIS — Z12.31 ENCOUNTER FOR SCREENING MAMMOGRAM FOR MALIGNANT NEOPLASM OF BREAST: ICD-10-CM

## 2023-03-24 DIAGNOSIS — E78.5 HYPERLIPIDEMIA LDL GOAL <100: Primary | ICD-10-CM

## 2023-03-24 DIAGNOSIS — I10 ESSENTIAL HYPERTENSION: ICD-10-CM

## 2023-03-24 DIAGNOSIS — F43.0 ACUTE STRESS REACTION: ICD-10-CM

## 2023-03-24 DIAGNOSIS — Z12.11 COLON CANCER SCREENING: ICD-10-CM

## 2023-03-24 LAB
ALBUMIN SERPL-MCNC: 3.3 G/DL (ref 3.5–5)
ALBUMIN/GLOB SERPL: 0.9 (ref 1.1–2.2)
ALP SERPL-CCNC: 97 U/L (ref 45–117)
ALT SERPL-CCNC: 14 U/L (ref 12–78)
ANION GAP SERPL CALC-SCNC: 5 MMOL/L (ref 5–15)
AST SERPL-CCNC: 10 U/L (ref 15–37)
BILIRUB SERPL-MCNC: 0.5 MG/DL (ref 0.2–1)
BUN SERPL-MCNC: 10 MG/DL (ref 6–20)
BUN/CREAT SERPL: 11 (ref 12–20)
CALCIUM SERPL-MCNC: 9.1 MG/DL (ref 8.5–10.1)
CHLORIDE SERPL-SCNC: 102 MMOL/L (ref 97–108)
CHOLEST SERPL-MCNC: 153 MG/DL
CO2 SERPL-SCNC: 29 MMOL/L (ref 21–32)
CREAT SERPL-MCNC: 0.89 MG/DL (ref 0.55–1.02)
CREAT UR-MCNC: 71 MG/DL
EST. AVERAGE GLUCOSE BLD GHB EST-MCNC: 272 MG/DL
GLOBULIN SER CALC-MCNC: 3.7 G/DL (ref 2–4)
GLUCOSE SERPL-MCNC: 355 MG/DL (ref 65–100)
HBA1C MFR BLD: 11.1 % (ref 4–5.6)
HDLC SERPL-MCNC: 39 MG/DL
HDLC SERPL: 3.9 (ref 0–5)
LDLC SERPL CALC-MCNC: 96.6 MG/DL (ref 0–100)
MICROALBUMIN UR-MCNC: 4.14 MG/DL
MICROALBUMIN/CREAT UR-RTO: 58 MG/G (ref 0–30)
POTASSIUM SERPL-SCNC: 4.1 MMOL/L (ref 3.5–5.1)
PROT SERPL-MCNC: 7 G/DL (ref 6.4–8.2)
SODIUM SERPL-SCNC: 136 MMOL/L (ref 136–145)
TRIGL SERPL-MCNC: 87 MG/DL (ref ?–150)
VLDLC SERPL CALC-MCNC: 17.4 MG/DL

## 2023-03-24 RX ORDER — AMLODIPINE BESYLATE 5 MG/1
5 TABLET ORAL DAILY
Qty: 30 TABLET | Refills: 2 | Status: SHIPPED | OUTPATIENT
Start: 2023-03-24

## 2023-03-24 RX ORDER — DULAGLUTIDE 3 MG/.5ML
3 INJECTION, SOLUTION SUBCUTANEOUS
Qty: 4 EACH | Refills: 3 | Status: SHIPPED | OUTPATIENT
Start: 2023-03-24

## 2023-03-24 NOTE — PROGRESS NOTES
Progress Note    she is a 49y.o. year old female who presents for evalution. Subjective:     Pt here for Dm follow up and was better last check at 7.6 down from 11's. Pt is requesting medication to also help her lose some weight. Her BP is elevated and was high last year too. States she is addicted to sweets since she was a child. Using 10 units of insulin a day. Lab Results   Component Value Date/Time    Hemoglobin A1c 7.6 (H) 03/03/2022 11:08 AM    Hemoglobin A1c (POC) 9.8 12/13/2019 09:27 AM     On crestor she is not fasting today/    Lab Results   Component Value Date/Time    Cholesterol, total 141 03/03/2022 11:08 AM    HDL Cholesterol 37 03/03/2022 11:08 AM    LDL, calculated 94.6 03/03/2022 11:08 AM    VLDL, calculated 9.4 03/03/2022 11:08 AM    Triglyceride 47 03/03/2022 11:08 AM    CHOL/HDL Ratio 3.8 03/03/2022 11:08 AM       Patient is also been quite stressed out. States she has her adult children in their 25s living at her home they can afford to move out. Causing issues in the home stressing her out. Reviewed PmHx, RxHx, FmHx, SocHx, AllgHx and updated and dated in the chart. Review of Systems - negative except as listed above in the HPI    Objective:     Vitals:    03/24/23 1456   BP: (!) 152/89   Pulse: 95   Resp: 18   Temp: 98.3 °F (36.8 °C)   TempSrc: Oral   SpO2: 96%   Weight: 302 lb (137 kg)   Height: 5' 7\" (1.702 m)       Current Outpatient Medications   Medication Sig    dulaglutide (Trulicity) 3 TH/8.3 mL pnij 3 mg by SubCUTAneous route every seven (7) days. amLODIPine (NORVASC) 5 mg tablet Take 1 Tablet by mouth daily.     insulin glargine (Basaglar KwikPen U-100 Insulin) 100 unit/mL (3 mL) inpn INJECT 10 UNITS SUBCUTANEOUSLY AT BEDTIME; THIS WILL BE TITRATED THRU OUT THE MONTH    gabapentin (NEURONTIN) 100 mg capsule TAKE 1 CAPSULE BY MOUTH ONCE DAILY AT NIGHT    metoprolol tartrate (LOPRESSOR) 25 mg tablet Take 1 tablet by mouth twice daily    rosuvastatin (CRESTOR) 10 mg tablet Take 1 Tablet by mouth nightly. Insulin Needles, Disposable, (Comfort EZ Pen Needles) 32 gauge x 5/16\" ndle 1 shot daily    lancets misc Check BG bid IDDM E11.65 whichever insurance prefers    Blood-Glucose Meter monitoring kit Check BG bid IDDM E11.65 whichever insurance prefers    glucose blood VI test strips (blood glucose test) strip Check BG bid IDDM E11.65 whichever insurance prefers     No current facility-administered medications for this visit. Physical Examination: General appearance - alert, well appearing, and in no distress  Chest - clear to auscultation, no wheezes, rales or rhonchi, symmetric air entry  Heart - normal rate, regular rhythm, normal S1, S2, no murmurs, rubs, clicks or gallops      Assessment/ Plan:   Diagnoses and all orders for this visit:    1. Hyperlipidemia LDL goal <100  -     LIPID PANEL; Future  -     METABOLIC PANEL, COMPREHENSIVE; Future    2. Type 2 diabetes mellitus with hyperglycemia, with long-term current use of insulin (HCC)  -     dulaglutide (Trulicity) 3 NK/4.1 mL pnij; 3 mg by SubCUTAneous route every seven (7) days.  -     HEMOGLOBIN A1C WITH EAG; Future  -     MICROALBUMIN, UR, RAND W/ MICROALB/CREAT RATIO; Future    3. Colon cancer screening  -     REFERRAL TO GASTROENTEROLOGY    4. Encounter for screening mammogram for malignant neoplasm of breast  -     [facility] 3D JASKARAN W MAMMO BI SCREENING INCL CAD; Future    5. Essential hypertension  -     amLODIPine (NORVASC) 5 mg tablet; Take 1 Tablet by mouth daily. 6. Acute stress reaction  Patient would like to monitor for now. She should be following up in roughly a month for a blood pressure check we can revisit at that time. Not interested in medication at this time. Follow-up and Dispositions    Return in about 4 weeks (around 4/21/2023), or if symptoms worsen or fail to improve, for bp check.            I have discussed the diagnosis with the patient and the intended plan as seen in the above orders. The patient has received an after-visit summary and questions were answered concerning future plans. Pt conveyed understanding of plan.     Medication Side Effects and Warnings were discussed with patient    An electronic signature was used to authenticate this note  Jhonatan Trotter DO

## 2023-03-24 NOTE — PROGRESS NOTES
Chief Complaint   Patient presents with    Diabetes     Patient presents in office today for diabetes check. Would like to discuss starting something to help her lose weight. Has c/o pain in her feet. No other concerns. 1. Have you been to the ER, urgent care clinic since your last visit? Hospitalized since your last visit? No    2. Have you seen or consulted any other health care providers outside of the [address] [place] [address] since your last visit? Include any pap smears or colon screening.  No      Learning Assessment 6/20/2019   PRIMARY LEARNER Patient   BARRIERS PRIMARY LEARNER NONE   CO-LEARNER CAREGIVER -   PRIMARY LANGUAGE ENGLISH   LEARNER PREFERENCE PRIMARY LISTENING     PICTURES     VIDEOS   ANSWERED BY patient   RELATIONSHIP SELF

## 2023-03-24 NOTE — PATIENT INSTRUCTIONS

## 2023-03-28 DIAGNOSIS — E11.65 TYPE 2 DIABETES MELLITUS WITH HYPERGLYCEMIA, WITH LONG-TERM CURRENT USE OF INSULIN (HCC): ICD-10-CM

## 2023-03-28 DIAGNOSIS — Z79.4 TYPE 2 DIABETES MELLITUS WITH HYPERGLYCEMIA, WITH LONG-TERM CURRENT USE OF INSULIN (HCC): ICD-10-CM

## 2023-03-28 RX ORDER — INSULIN GLARGINE 100 [IU]/ML
INJECTION, SOLUTION SUBCUTANEOUS
Qty: 15 ML | Refills: 2 | Status: SHIPPED | OUTPATIENT
Start: 2023-03-28

## 2023-04-05 RX ORDER — LOSARTAN POTASSIUM 25 MG/1
25 TABLET ORAL DAILY
Qty: 30 TABLET | Refills: 11 | Status: SHIPPED
Start: 2023-04-05

## 2023-08-16 RX ORDER — DULAGLUTIDE 3 MG/.5ML
3 INJECTION, SOLUTION SUBCUTANEOUS
Qty: 4 ML | Refills: 1 | Status: SHIPPED | OUTPATIENT
Start: 2023-08-16

## 2023-08-20 ENCOUNTER — HOSPITAL ENCOUNTER (EMERGENCY)
Facility: HOSPITAL | Age: 48
Discharge: HOME | End: 2023-08-20
Attending: EMERGENCY MEDICINE
Payer: COMMERCIAL

## 2023-08-20 VITALS
OXYGEN SATURATION: 99 % | WEIGHT: 290 LBS | BODY MASS INDEX: 45.52 KG/M2 | HEART RATE: 84 BPM | RESPIRATION RATE: 16 BRPM | TEMPERATURE: 97.6 F | DIASTOLIC BLOOD PRESSURE: 98 MMHG | HEIGHT: 67 IN | SYSTOLIC BLOOD PRESSURE: 178 MMHG

## 2023-08-20 DIAGNOSIS — M54.2 NECK PAIN: ICD-10-CM

## 2023-08-20 DIAGNOSIS — M25.511 ACUTE PAIN OF RIGHT SHOULDER: Primary | ICD-10-CM

## 2023-08-20 DIAGNOSIS — M67.911 TENDINOPATHY OF RIGHT SHOULDER: ICD-10-CM

## 2023-08-20 DIAGNOSIS — M62.838 CERVICAL PARASPINAL MUSCLE SPASM: ICD-10-CM

## 2023-08-20 DIAGNOSIS — I10 HYPERTENSION, UNSPECIFIED TYPE: ICD-10-CM

## 2023-08-20 PROCEDURE — 99283 EMERGENCY DEPT VISIT LOW MDM: CPT

## 2023-08-20 PROCEDURE — 6370000000 HC RX 637 (ALT 250 FOR IP): Performed by: EMERGENCY MEDICINE

## 2023-08-20 RX ORDER — IBUPROFEN 600 MG/1
600 TABLET ORAL 4 TIMES DAILY PRN
Qty: 30 TABLET | Refills: 0 | Status: SHIPPED | OUTPATIENT
Start: 2023-08-20

## 2023-08-20 RX ORDER — IBUPROFEN 800 MG/1
800 TABLET ORAL
Status: COMPLETED | OUTPATIENT
Start: 2023-08-20 | End: 2023-08-20

## 2023-08-20 RX ORDER — CYCLOBENZAPRINE HCL 10 MG
10 TABLET ORAL
Status: COMPLETED | OUTPATIENT
Start: 2023-08-20 | End: 2023-08-20

## 2023-08-20 RX ORDER — TRAMADOL HYDROCHLORIDE 50 MG/1
50 TABLET ORAL
Status: COMPLETED | OUTPATIENT
Start: 2023-08-20 | End: 2023-08-20

## 2023-08-20 RX ORDER — CYCLOBENZAPRINE HCL 10 MG
10 TABLET ORAL 3 TIMES DAILY PRN
Qty: 10 TABLET | Refills: 0 | Status: SHIPPED | OUTPATIENT
Start: 2023-08-20

## 2023-08-20 RX ADMIN — IBUPROFEN 800 MG: 800 TABLET, FILM COATED ORAL at 08:26

## 2023-08-20 RX ADMIN — CYCLOBENZAPRINE 10 MG: 10 TABLET, FILM COATED ORAL at 08:26

## 2023-08-20 RX ADMIN — TRAMADOL HYDROCHLORIDE 50 MG: 50 TABLET ORAL at 08:26

## 2023-08-20 ASSESSMENT — PAIN - FUNCTIONAL ASSESSMENT: PAIN_FUNCTIONAL_ASSESSMENT: 0-10

## 2023-08-20 ASSESSMENT — PAIN DESCRIPTION - DESCRIPTORS: DESCRIPTORS: SHARP;SHOOTING

## 2023-08-20 ASSESSMENT — PAIN DESCRIPTION - ORIENTATION: ORIENTATION: RIGHT

## 2023-08-20 ASSESSMENT — PAIN DESCRIPTION - LOCATION: LOCATION: NECK;SHOULDER

## 2023-08-20 ASSESSMENT — PAIN SCALES - GENERAL: PAINLEVEL_OUTOF10: 10

## 2023-08-20 NOTE — DISCHARGE INSTRUCTIONS
Thank you! Thank you for allowing me to care for you in the emergency department. It is my goal to provide you with excellent care. If you have not received excellent quality care, please ask to speak to the nurse manager. Please fill out the survey that will come to you by mail or email since we listen to your feedback! Below you will find a list of your tests from today's visit. Should you have any questions, please do not hesitate to call the emergency department. Labs  No results found for this or any previous visit (from the past 12 hour(s)). Radiologic Studies  No orders to display     ------------------------------------------------------------------------------------------------------------  The exam and treatment you received in the Emergency Department were for an urgent problem and are not intended as complete care. It is important that you follow-up with a doctor, nurse practitioner, or physician assistant to:  (1) confirm your diagnosis,  (2) re-evaluation of changes in your illness and treatment, and  (3) for ongoing care. Please take your discharge instructions with you when you go to your follow-up appointment. If you have any problem arranging a follow-up appointment, contact the Emergency Department. If your symptoms become worse or you do not improve as expected and you are unable to reach your health care provider, please return to the Emergency Department. We are available 24 hours a day. If a prescription has been provided, please have it filled as soon as possible to prevent a delay in treatment. If you have any questions or reservations about taking the medication due to side effects or interactions with other medications, please call your primary care provider or contact the ER.
negative...

## 2023-08-20 NOTE — ED PROVIDER NOTES
medications:  Medications   traMADol (ULTRAM) tablet 50 mg (has no administration in time range)   cyclobenzaprine (FLEXERIL) tablet 10 mg (has no administration in time range)   ibuprofen (ADVIL;MOTRIN) tablet 800 mg (has no administration in time range)       CONSULTS: (Who and What was discussed)  None     Social Determinants affecting Dx or Tx: None    Smoking Cessation: Not Applicable    PROCEDURES   Unless otherwise noted above, none. Procedures      CRITICAL CARE TIME   Patient does not meet Critical Care Time, 0 minutes    FINAL IMPRESSION     1. Acute pain of right shoulder    2. Neck pain    3. Cervical paraspinal muscle spasm    4. Tendinopathy of right shoulder    5. Hypertension, unspecified type          DISPOSITION/PLAN   DISPOSITION Decision To Discharge 08/20/2023 08:13:59 AM    Discharge Note: The patient is stable for discharge home. The signs, symptoms, diagnosis, and discharge instructions have been discussed, understanding conveyed, and agreed upon. The patient is to follow up as recommended or return to ER should their symptoms worsen.       PATIENT REFERRED TO:  Taylor Cisneros DO  [address] [facility]  [address]  [phone]    Schedule an appointment as soon as possible for a visit   As needed for ongoing management of shoulder and neck pain    [address] [facility]  [address]  [phone]    As needed, If symptoms worsen        DISCHARGE MEDICATIONS:     Medication List        START taking these medications      cyclobenzaprine 10 MG tablet  Commonly known as: FLEXERIL  Take 1 tablet by mouth 3 times daily as needed for Muscle spasms     ibuprofen 600 MG tablet  Commonly known as: ADVIL;MOTRIN  Take 1 tablet by mouth 4 times daily as needed for Pain            CONTINUE taking these medications      Lancets Misc            ASK your doctor about these medications      amLODIPine 5 MG tablet  Commonly known as: Petar Garzon

## 2023-08-20 NOTE — ED TRIAGE NOTES
2 wks, constant sharp shooting pain from right side of neck down to right elbow, movement makes worse. Denies trauma or heavy lifting, tylenol motrin dont work, hasn't seen PCP for this. Good ROM, PMS equal to both arms.

## 2023-09-19 ENCOUNTER — TELEMEDICINE (OUTPATIENT)
Age: 48
End: 2023-09-19
Payer: COMMERCIAL

## 2023-09-19 ENCOUNTER — TELEPHONE (OUTPATIENT)
Age: 48
End: 2023-09-19

## 2023-09-19 DIAGNOSIS — Z79.4 TYPE 2 DIABETES MELLITUS WITH HYPERGLYCEMIA, WITH LONG-TERM CURRENT USE OF INSULIN (HCC): ICD-10-CM

## 2023-09-19 DIAGNOSIS — E11.65 TYPE 2 DIABETES MELLITUS WITH HYPERGLYCEMIA, WITH LONG-TERM CURRENT USE OF INSULIN (HCC): ICD-10-CM

## 2023-09-19 DIAGNOSIS — M54.50 ACUTE MIDLINE LOW BACK PAIN WITHOUT SCIATICA: Primary | ICD-10-CM

## 2023-09-19 PROCEDURE — 99214 OFFICE O/P EST MOD 30 MIN: CPT | Performed by: FAMILY MEDICINE

## 2023-09-19 PROCEDURE — 3046F HEMOGLOBIN A1C LEVEL >9.0%: CPT | Performed by: FAMILY MEDICINE

## 2023-09-19 RX ORDER — DULAGLUTIDE 4.5 MG/.5ML
4.5 INJECTION, SOLUTION SUBCUTANEOUS WEEKLY
Qty: 4 ADJUSTABLE DOSE PRE-FILLED PEN SYRINGE | Refills: 5 | Status: SHIPPED | OUTPATIENT
Start: 2023-09-19

## 2023-09-19 RX ORDER — KETOROLAC TROMETHAMINE 10 MG/1
10 TABLET, FILM COATED ORAL EVERY 6 HOURS PRN
Qty: 16 TABLET | Refills: 0 | Status: SHIPPED | OUTPATIENT
Start: 2023-09-19 | End: 2024-09-18

## 2023-09-19 RX ORDER — CYCLOBENZAPRINE HCL 10 MG
10 TABLET ORAL 3 TIMES DAILY PRN
Qty: 30 TABLET | Refills: 0 | Status: SHIPPED | OUTPATIENT
Start: 2023-09-19 | End: 2023-09-29

## 2024-02-14 DIAGNOSIS — M54.50 ACUTE MIDLINE LOW BACK PAIN WITHOUT SCIATICA: ICD-10-CM

## 2024-02-19 RX ORDER — KETOROLAC TROMETHAMINE 10 MG/1
10 TABLET, FILM COATED ORAL EVERY 6 HOURS PRN
Qty: 16 TABLET | Refills: 0 | OUTPATIENT
Start: 2024-02-19 | End: 2025-02-18

## 2025-07-14 ENCOUNTER — HOSPITAL ENCOUNTER (EMERGENCY)
Facility: HOSPITAL | Age: 50
Discharge: HOME | End: 2025-07-14
Attending: EMERGENCY MEDICINE
Payer: COMMERCIAL

## 2025-07-14 ENCOUNTER — APPOINTMENT (OUTPATIENT)
Facility: HOSPITAL | Age: 50
End: 2025-07-14
Payer: COMMERCIAL

## 2025-07-14 VITALS
BODY MASS INDEX: 45.52 KG/M2 | RESPIRATION RATE: 16 BRPM | SYSTOLIC BLOOD PRESSURE: 197 MMHG | TEMPERATURE: 99.1 F | OXYGEN SATURATION: 99 % | HEIGHT: 67 IN | DIASTOLIC BLOOD PRESSURE: 93 MMHG | HEART RATE: 100 BPM | WEIGHT: 290 LBS

## 2025-07-14 DIAGNOSIS — R07.9 CHEST PAIN, UNSPECIFIED TYPE: Primary | ICD-10-CM

## 2025-07-14 LAB
ALBUMIN SERPL-MCNC: 3.5 G/DL (ref 3.5–5.2)
ALBUMIN/GLOB SERPL: 1 (ref 1.1–2.2)
ALP SERPL-CCNC: 123 U/L (ref 35–104)
ALT SERPL-CCNC: 8 U/L (ref 10–35)
ANION GAP SERPL CALC-SCNC: 9 MMOL/L (ref 2–12)
APPEARANCE UR: ABNORMAL
AST SERPL-CCNC: 11 U/L (ref 10–35)
BACTERIA URNS QL MICRO: ABNORMAL /HPF
BASOPHILS # BLD: 0.05 K/UL (ref 0–0.1)
BASOPHILS NFR BLD: 0.6 % (ref 0–1)
BILIRUB SERPL-MCNC: 0.5 MG/DL (ref 0.2–1)
BILIRUB UR QL: NEGATIVE
BUN SERPL-MCNC: 13 MG/DL (ref 6–20)
BUN/CREAT SERPL: 15 (ref 12–20)
CALCIUM SERPL-MCNC: 9.1 MG/DL (ref 8.6–10)
CHLORIDE SERPL-SCNC: 99 MMOL/L (ref 98–107)
CO2 SERPL-SCNC: 26 MMOL/L (ref 22–29)
COLOR UR: ABNORMAL
CREAT SERPL-MCNC: 0.84 MG/DL (ref 0.5–0.9)
D DIMER PPP FEU-MCNC: 0.85 UG/ML(FEU)
DIFFERENTIAL METHOD BLD: ABNORMAL
EOSINOPHIL # BLD: 0.07 K/UL (ref 0–0.4)
EOSINOPHIL NFR BLD: 0.9 % (ref 0–7)
EPITH CASTS URNS QL MICRO: ABNORMAL /LPF
ERYTHROCYTE [DISTWIDTH] IN BLOOD BY AUTOMATED COUNT: 13.1 % (ref 11.5–14.5)
GLOBULIN SER CALC-MCNC: 3.6 G/DL (ref 2–4)
GLUCOSE SERPL-MCNC: 389 MG/DL (ref 65–100)
GLUCOSE UR STRIP.AUTO-MCNC: >1000 MG/DL
HCG UR QL: NEGATIVE
HCT VFR BLD AUTO: 35 % (ref 35–47)
HGB BLD-MCNC: 12 G/DL (ref 11.5–16)
HGB UR QL STRIP: ABNORMAL
IMM GRANULOCYTES # BLD AUTO: 0.05 K/UL (ref 0–0.04)
IMM GRANULOCYTES NFR BLD AUTO: 0.6 % (ref 0–0.5)
KETONES UR QL STRIP.AUTO: ABNORMAL MG/DL
LEUKOCYTE ESTERASE UR QL STRIP.AUTO: NEGATIVE
LIPASE SERPL-CCNC: 31 U/L (ref 13–60)
LYMPHOCYTES # BLD: 1.17 K/UL (ref 0.8–3.5)
LYMPHOCYTES NFR BLD: 14.3 % (ref 12–49)
MCH RBC QN AUTO: 29.9 PG (ref 26–34)
MCHC RBC AUTO-ENTMCNC: 34.3 G/DL (ref 30–36.5)
MCV RBC AUTO: 87.3 FL (ref 80–99)
MONOCYTES # BLD: 0.66 K/UL (ref 0–1)
MONOCYTES NFR BLD: 8 % (ref 5–13)
NEUTS SEG # BLD: 6.21 K/UL (ref 1.8–8)
NEUTS SEG NFR BLD: 75.6 % (ref 32–75)
NITRITE UR QL STRIP.AUTO: NEGATIVE
NRBC # BLD: 0 K/UL (ref 0–0.01)
NRBC BLD-RTO: 0 PER 100 WBC
PH UR STRIP: 6 (ref 5–8)
PLATELET # BLD AUTO: 181 K/UL (ref 150–400)
PMV BLD AUTO: 12.4 FL (ref 8.9–12.9)
POTASSIUM SERPL-SCNC: 4.1 MMOL/L (ref 3.5–5.1)
PROT SERPL-MCNC: 7.1 G/DL (ref 6.4–8.3)
PROT UR STRIP-MCNC: 100 MG/DL
RBC # BLD AUTO: 4.01 M/UL (ref 3.8–5.2)
RBC #/AREA URNS HPF: ABNORMAL /HPF
SODIUM SERPL-SCNC: 134 MMOL/L (ref 136–145)
SP GR UR REFRACTOMETRY: 1.01 (ref 1–1.03)
SPECIMEN HOLD: NORMAL
TROPONIN T SERPL HS-MCNC: 18 NG/L (ref 0–14)
TROPONIN T SERPL HS-MCNC: 21.6 NG/L (ref 0–14)
UROBILINOGEN UR QL STRIP.AUTO: 1 EU/DL (ref 0.2–1)
WBC # BLD AUTO: 8.2 K/UL (ref 3.6–11)
WBC URNS QL MICRO: ABNORMAL /HPF (ref 0–4)

## 2025-07-14 PROCEDURE — 85379 FIBRIN DEGRADATION QUANT: CPT

## 2025-07-14 PROCEDURE — 81025 URINE PREGNANCY TEST: CPT

## 2025-07-14 PROCEDURE — 71275 CT ANGIOGRAPHY CHEST: CPT

## 2025-07-14 PROCEDURE — 83690 ASSAY OF LIPASE: CPT

## 2025-07-14 PROCEDURE — 36415 COLL VENOUS BLD VENIPUNCTURE: CPT

## 2025-07-14 PROCEDURE — 6360000004 HC RX CONTRAST MEDICATION: Performed by: EMERGENCY MEDICINE

## 2025-07-14 PROCEDURE — 85025 COMPLETE CBC W/AUTO DIFF WBC: CPT

## 2025-07-14 PROCEDURE — 84484 ASSAY OF TROPONIN QUANT: CPT

## 2025-07-14 PROCEDURE — 80053 COMPREHEN METABOLIC PANEL: CPT

## 2025-07-14 PROCEDURE — 81001 URINALYSIS AUTO W/SCOPE: CPT

## 2025-07-14 PROCEDURE — 99285 EMERGENCY DEPT VISIT HI MDM: CPT

## 2025-07-14 PROCEDURE — 93005 ELECTROCARDIOGRAM TRACING: CPT | Performed by: EMERGENCY MEDICINE

## 2025-07-14 PROCEDURE — 71046 X-RAY EXAM CHEST 2 VIEWS: CPT

## 2025-07-14 RX ORDER — IOPAMIDOL 755 MG/ML
100 INJECTION, SOLUTION INTRAVASCULAR
Status: COMPLETED | OUTPATIENT
Start: 2025-07-14 | End: 2025-07-14

## 2025-07-14 RX ADMIN — IOPAMIDOL 100 ML: 755 INJECTION, SOLUTION INTRAVENOUS at 13:25

## 2025-07-14 ASSESSMENT — ENCOUNTER SYMPTOMS
SHORTNESS OF BREATH: 0
DIARRHEA: 0
VOMITING: 0
BACK PAIN: 0
NAUSEA: 0
CHEST TIGHTNESS: 0
ABDOMINAL PAIN: 0
EYE DISCHARGE: 0
COUGH: 0
SORE THROAT: 0
FACIAL SWELLING: 0
EYE PAIN: 0

## 2025-07-14 ASSESSMENT — PAIN - FUNCTIONAL ASSESSMENT: PAIN_FUNCTIONAL_ASSESSMENT: 0-10

## 2025-07-14 ASSESSMENT — LIFESTYLE VARIABLES
HOW OFTEN DO YOU HAVE A DRINK CONTAINING ALCOHOL: NEVER
HOW MANY STANDARD DRINKS CONTAINING ALCOHOL DO YOU HAVE ON A TYPICAL DAY: PATIENT DOES NOT DRINK

## 2025-07-14 ASSESSMENT — PAIN SCALES - GENERAL: PAINLEVEL_OUTOF10: 7

## 2025-07-14 NOTE — ED PROVIDER NOTES
Neurological:      General: No focal deficit present.      Mental Status: She is alert and oriented to person, place, and time. Mental status is at baseline.   Psychiatric:         Mood and Affect: Mood normal.         Behavior: Behavior normal.         DIAGNOSTIC RESULTS     EKG: All EKG's are interpreted by the Emergency Department Physician who either signs or Co-signs this chart in the absence of a cardiologist.        RADIOLOGY:   Non-plain film images such as CT, Ultrasound and MRI are read by the radiologist. Plain radiographic images are visualized and preliminarily interpreted by the emergency physician with the below findings:        Interpretation per the Radiologist below, if available at the time of this note:    CTA CHEST W WO CONTRAST PE Eval   Final Result   No acute pulmonary embolism   Trace subsegmental atelectasis in the lingula.   Nonspecific hypodensity in the liver which may represent focal hepatic   steatosis, hemangioma, or adenoma.         Electronically signed by Judith JAMISON CHEST (2 VW)   Final Result   1. No acute process         Electronically signed by Gael Ballard           LABS:  Labs Reviewed   CBC WITH AUTO DIFFERENTIAL - Abnormal; Notable for the following components:       Result Value    Neutrophils % 75.6 (*)     Immature Granulocytes % 0.6 (*)     Immature Granulocytes Absolute 0.05 (*)     All other components within normal limits   COMPREHENSIVE METABOLIC PANEL - Abnormal; Notable for the following components:    Sodium 134 (*)     Glucose 389 (*)     ALT 8 (*)     Alk Phosphatase 123 (*)     Albumin/Globulin Ratio 1.0 (*)     All other components within normal limits   URINALYSIS WITH MICROSCOPIC - Abnormal; Notable for the following components:    Appearance HAZY (*)     Protein,  (*)     Glucose, Ur >1000 (*)     Ketones, Urine TRACE (*)     Blood, Urine SMALL (*)     Epithelial Cells, UA MODERATE (*)     BACTERIA, URINE 3+ (*)     All other components

## 2025-07-14 NOTE — ED TRIAGE NOTES
Pt arrives to ER POV c/o breast and left flank pain that started about a month ago with pain worsening.

## 2025-07-15 LAB
EKG ATRIAL RATE: 100 BPM
EKG DIAGNOSIS: NORMAL
EKG P AXIS: 56 DEGREES
EKG P-R INTERVAL: 162 MS
EKG Q-T INTERVAL: 332 MS
EKG QRS DURATION: 86 MS
EKG QTC CALCULATION (BAZETT): 428 MS
EKG R AXIS: 37 DEGREES
EKG T AXIS: 27 DEGREES
EKG VENTRICULAR RATE: 100 BPM

## 2025-07-15 PROCEDURE — 93010 ELECTROCARDIOGRAM REPORT: CPT | Performed by: SPECIALIST
